# Patient Record
Sex: FEMALE | Employment: UNEMPLOYED | URBAN - METROPOLITAN AREA
[De-identification: names, ages, dates, MRNs, and addresses within clinical notes are randomized per-mention and may not be internally consistent; named-entity substitution may affect disease eponyms.]

---

## 2017-06-03 ENCOUNTER — HOSPITAL ENCOUNTER (OUTPATIENT)
Dept: MAMMOGRAPHY | Facility: HOSPITAL | Age: 67
Discharge: HOME | End: 2017-06-03
Payer: MEDICARE

## 2017-06-03 DIAGNOSIS — Z12.31 ENCOUNTER FOR SCREENING MAMMOGRAM FOR MALIGNANT NEOPLASM OF BREAST: ICD-10-CM

## 2017-06-03 PROCEDURE — 77067 SCR MAMMO BI INCL CAD: CPT | Performed by: INTERNAL MEDICINE

## 2017-06-03 PROCEDURE — 77067 SCR MAMMO BI INCL CAD: CPT

## 2020-01-31 ENCOUNTER — OFFICE VISIT (OUTPATIENT)
Dept: INTERNAL MEDICINE CLINIC | Facility: CLINIC | Age: 70
End: 2020-01-31
Payer: COMMERCIAL

## 2020-01-31 VITALS
HEIGHT: 66 IN | SYSTOLIC BLOOD PRESSURE: 137 MMHG | BODY MASS INDEX: 26.17 KG/M2 | WEIGHT: 162.81 LBS | DIASTOLIC BLOOD PRESSURE: 77 MMHG | TEMPERATURE: 98 F | HEART RATE: 82 BPM

## 2020-01-31 DIAGNOSIS — D22.9 NUMEROUS MOLES: ICD-10-CM

## 2020-01-31 DIAGNOSIS — Z78.0 POST-MENOPAUSAL: ICD-10-CM

## 2020-01-31 DIAGNOSIS — Z12.11 COLON CANCER SCREENING: ICD-10-CM

## 2020-01-31 DIAGNOSIS — Z12.31 VISIT FOR SCREENING MAMMOGRAM: ICD-10-CM

## 2020-01-31 DIAGNOSIS — M15.9 PRIMARY OSTEOARTHRITIS INVOLVING MULTIPLE JOINTS: ICD-10-CM

## 2020-01-31 DIAGNOSIS — E78.5 HYPERLIPIDEMIA, UNSPECIFIED HYPERLIPIDEMIA TYPE: Primary | ICD-10-CM

## 2020-01-31 PROCEDURE — 99204 OFFICE O/P NEW MOD 45 MIN: CPT | Performed by: INTERNAL MEDICINE

## 2020-01-31 PROCEDURE — G0463 HOSPITAL OUTPT CLINIC VISIT: HCPCS | Performed by: INTERNAL MEDICINE

## 2020-01-31 PROCEDURE — 90732 PPSV23 VACC 2 YRS+ SUBQ/IM: CPT | Performed by: INTERNAL MEDICINE

## 2020-01-31 PROCEDURE — G0009 ADMIN PNEUMOCOCCAL VACCINE: HCPCS | Performed by: INTERNAL MEDICINE

## 2020-01-31 RX ORDER — CETIRIZINE HYDROCHLORIDE 10 MG/1
10 TABLET ORAL DAILY
COMMUNITY
End: 2021-03-30

## 2020-01-31 RX ORDER — ATORVASTATIN CALCIUM 20 MG/1
20 TABLET, FILM COATED ORAL NIGHTLY
COMMUNITY
End: 2020-06-23

## 2020-01-31 NOTE — PROGRESS NOTES
HPI:    Patient ID: Angelica Gordon is a 71year old female.     HPI    New paient    Hx of Bell's palsy left 2 years ago residual weakness left  mammo sept 2019 dense breast  Due for pneumonia shot  Flu shot done  dexa due  Colonoscopy due    hyperlipidemi Negative for agitation and behavioral problems. Current Outpatient Medications   Medication Sig Dispense Refill   • atorvastatin 20 MG Oral Tab Take 20 mg by mouth nightly. • cetirizine 10 MG Oral Tab Take 10 mg by mouth daily.        Allergies: or edema. Comments: Bell's palsy left     Lymphadenopathy:     She has no cervical adenopathy. Neurological: She is alert. Skin: Skin is warm and dry. No rash noted. She is not diaphoretic. No erythema.    Numerous moles   Nursing note and vitals r

## 2020-03-13 ENCOUNTER — TELEPHONE (OUTPATIENT)
Dept: CASE MANAGEMENT | Age: 70
End: 2020-03-13

## 2020-03-13 NOTE — TELEPHONE ENCOUNTER
Patient is eligible for a 2020 Medicare Advantage Supervisit. Discussed in detail w/patient. Appt scheduled for 3/27/2020.

## 2020-05-22 ENCOUNTER — OFFICE VISIT (OUTPATIENT)
Dept: DERMATOLOGY CLINIC | Facility: CLINIC | Age: 70
End: 2020-05-22
Payer: COMMERCIAL

## 2020-05-22 DIAGNOSIS — D23.60 BENIGN NEOPLASM OF SKIN OF UPPER LIMB, INCLUDING SHOULDER, UNSPECIFIED LATERALITY: ICD-10-CM

## 2020-05-22 DIAGNOSIS — D22.9 MULTIPLE NEVI: ICD-10-CM

## 2020-05-22 DIAGNOSIS — D23.30 BENIGN NEOPLASM OF SKIN OF FACE: ICD-10-CM

## 2020-05-22 DIAGNOSIS — D23.70 BENIGN NEOPLASM OF SKIN OF LOWER LIMB, INCLUDING HIP, UNSPECIFIED LATERALITY: ICD-10-CM

## 2020-05-22 DIAGNOSIS — D23.4 BENIGN NEOPLASM OF SCALP AND SKIN OF NECK: ICD-10-CM

## 2020-05-22 DIAGNOSIS — L56.5 DSAP (DISSEMINATED SUPERFICIAL ACTINIC POROKERATOSIS): ICD-10-CM

## 2020-05-22 DIAGNOSIS — L82.1 SEBORRHEIC KERATOSES: Primary | ICD-10-CM

## 2020-05-22 DIAGNOSIS — L81.4 LENTIGO: ICD-10-CM

## 2020-05-22 PROCEDURE — 99202 OFFICE O/P NEW SF 15 MIN: CPT | Performed by: DERMATOLOGY

## 2020-05-22 PROCEDURE — G0463 HOSPITAL OUTPT CLINIC VISIT: HCPCS | Performed by: DERMATOLOGY

## 2020-05-31 NOTE — PROGRESS NOTES
Cherelle Bardales is a 71year old female. HPI:     CC:  Patient presents with:  Lesion: New pt. pt presenting today with lesions to Lside of face, bilateral thighs for about a year. c/o roughness. Denies itching or pain, No change in size or color.  Denies Days per week: Not on file        Minutes per session: Not on file      Stress: Not on file    Relationships      Social connections:        Talks on phone: Not on file        Gets together: Not on file        Attends Mandaen service: Not on file distress. Exam total-body performed, including scalp, head, neck, face,nails, hair, external eyes, including conjunctival mucosa, eyelids, lips external ears, back, chest,/ breasts, axillae,  abdomen, arms, legs, palms.      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 Over-the-counter alphahydroxy acids, moisturizers retinoids discussed. Cryo to more bothersome lesions. Fact these are likely to continue develop discussed.     Observe papular lesion at ri

## 2020-06-23 ENCOUNTER — OFFICE VISIT (OUTPATIENT)
Dept: INTERNAL MEDICINE CLINIC | Facility: CLINIC | Age: 70
End: 2020-06-23
Payer: COMMERCIAL

## 2020-06-23 VITALS
BODY MASS INDEX: 27 KG/M2 | DIASTOLIC BLOOD PRESSURE: 88 MMHG | TEMPERATURE: 99 F | WEIGHT: 168 LBS | SYSTOLIC BLOOD PRESSURE: 138 MMHG | HEART RATE: 87 BPM | HEIGHT: 66 IN

## 2020-06-23 DIAGNOSIS — Z01.00 EYE EXAM, ROUTINE: ICD-10-CM

## 2020-06-23 DIAGNOSIS — Z78.0 POST-MENOPAUSAL: ICD-10-CM

## 2020-06-23 DIAGNOSIS — Z00.00 ENCOUNTER FOR MEDICARE ANNUAL WELLNESS EXAM: Primary | ICD-10-CM

## 2020-06-23 DIAGNOSIS — Z12.31 VISIT FOR SCREENING MAMMOGRAM: ICD-10-CM

## 2020-06-23 DIAGNOSIS — D22.9 NUMEROUS MOLES: ICD-10-CM

## 2020-06-23 DIAGNOSIS — E78.5 HYPERLIPIDEMIA, UNSPECIFIED HYPERLIPIDEMIA TYPE: ICD-10-CM

## 2020-06-23 DIAGNOSIS — M15.9 PRIMARY OSTEOARTHRITIS INVOLVING MULTIPLE JOINTS: ICD-10-CM

## 2020-06-23 DIAGNOSIS — Z12.11 COLON CANCER SCREENING: ICD-10-CM

## 2020-06-23 PROCEDURE — G0439 PPPS, SUBSEQ VISIT: HCPCS | Performed by: INTERNAL MEDICINE

## 2020-06-23 PROCEDURE — 96160 PT-FOCUSED HLTH RISK ASSMT: CPT | Performed by: INTERNAL MEDICINE

## 2020-06-23 PROCEDURE — 99397 PER PM REEVAL EST PAT 65+ YR: CPT | Performed by: INTERNAL MEDICINE

## 2020-06-23 RX ORDER — ATORVASTATIN CALCIUM 20 MG/1
20 TABLET, FILM COATED ORAL NIGHTLY
Qty: 90 TABLET | Refills: 3 | Status: SHIPPED | OUTPATIENT
Start: 2020-06-23 | End: 2021-03-30

## 2020-06-24 NOTE — PROGRESS NOTES
HPI:   Angelica Gordon is a 71year old female who presents for a Medicare Subsequent Annual Wellness visit (Pt already had Initial Annual Wellness).       Her last annual assessment has been over 1 year: Annual Physical due on 12/30/1953     /88 (BP Team:  Migue Chapman MD as PCP - General (Internal Medicine)    There is no problem list on file for this patient.     Wt Readings from Last 3 Encounters:  06/23/20 : 168 lb (76.2 kg)  01/31/20 : 162 lb 12.8 oz (73.8 kg)     Last Cholesterol Labs:   No r Right arm, Patient Position: Sitting, Cuff Size: adult)   Pulse 87   Temp 98.6 °F (37 °C) (Oral)   Ht 5' 6\" (1.676 m)   Wt 168 lb (76.2 kg)   BMI 27.12 kg/m²  Estimated body mass index is 27.12 kg/m² as calculated from the following:    Height as of this oropharyngeal exudate or posterior oropharyngeal erythema. Eyes: Pupils are equal, round, and reactive to light. Conjunctivae and EOM are normal. Right eye exhibits no discharge. Left eye exhibits no discharge. No scleral icterus.    Cardiovascular: Izola Confer NO         DIFFERENTIAL, COMP METABOLIC PANEL (14)        Low chol diet advised    (Z12.11) Colon cancer screening  Plan: OP REFERRAL TO [facility] GI TELEPHONE COLON SCREEN        asymptamtic    (Z78.0) Post-menopausal  Plan: XR DEXA BONE DENSITOMETRY (CPT=77080 years No results found for this or any previous visit. No flowsheet data found. Fecal Occult Blood Annually No results found for: FOB No flowsheet data found.     Glaucoma Screening      Ophthalmology Visit Annually: Diabetics, FHx Glaucoma, AA>50, Hisp Template: FABIENNE ALFORD MEDICARE ANNUAL ASSESSMENT FEMALE Chris Thakur [[id number]]

## 2020-09-01 LAB
ALBUMIN/GLOBULIN RATIO: 1.6 (CALC) (ref 1–2.5)
ALBUMIN: 4.6 G/DL (ref 3.6–5.1)
ALKALINE PHOSPHATASE: 101 U/L (ref 37–153)
ALT: 25 U/L (ref 6–29)
APPEARANCE: CLEAR
AST: 21 U/L (ref 10–35)
BILIRUBIN, TOTAL: 0.5 MG/DL (ref 0.2–1.2)
BILIRUBIN: NEGATIVE
BUN: 14 MG/DL (ref 7–25)
CALCIUM: 9.8 MG/DL (ref 8.6–10.4)
CARBON DIOXIDE: 26 MMOL/L (ref 20–32)
CHLORIDE: 106 MMOL/L (ref 98–110)
CHOL/HDLC RATIO: 3.8 (CALC)
CHOLESTEROL, TOTAL: 185 MG/DL
COLOR: YELLOW
CREATININE: 0.81 MG/DL (ref 0.5–0.99)
EGFR IF AFRICN AM: 86 ML/MIN/1.73M2
EGFR IF NONAFRICN AM: 74 ML/MIN/1.73M2
GLOBULIN: 2.9 G/DL (CALC) (ref 1.9–3.7)
GLUCOSE: 106 MG/DL (ref 65–99)
GLUCOSE: NEGATIVE
HDL CHOLESTEROL: 49 MG/DL
HEMATOCRIT: 41.2 % (ref 35–45)
HEMOGLOBIN: 13.9 G/DL (ref 11.7–15.5)
KETONES: NEGATIVE
LDL-CHOLESTEROL: 104 MG/DL (CALC)
MCH: 28.5 PG (ref 27–33)
MCHC: 33.7 G/DL (ref 32–36)
MCV: 84.6 FL (ref 80–100)
MPV: 12.1 FL (ref 7.5–12.5)
NITRITE: NEGATIVE
NON-HDL CHOLESTEROL: 136 MG/DL (CALC)
OCCULT BLOOD: NEGATIVE
PH: 6.5 (ref 5–8)
PLATELET COUNT: 224 THOUSAND/UL (ref 140–400)
POTASSIUM: 4.2 MMOL/L (ref 3.5–5.3)
PROTEIN, TOTAL: 7.5 G/DL (ref 6.1–8.1)
PROTEIN: NEGATIVE
RDW: 13.9 % (ref 11–15)
RED BLOOD CELL COUNT: 4.87 MILLION/UL (ref 3.8–5.1)
SODIUM: 138 MMOL/L (ref 135–146)
SPECIFIC GRAVITY: 1.02 (ref 1–1.03)
T4, FREE: 1 NG/DL (ref 0.8–1.8)
TRIGLYCERIDES: 205 MG/DL
TSH: 4.28 MIU/L (ref 0.4–4.5)
WHITE BLOOD CELL COUNT: 7.2 THOUSAND/UL (ref 3.8–10.8)

## 2020-09-03 ENCOUNTER — TELEPHONE (OUTPATIENT)
Dept: GASTROENTEROLOGY | Facility: CLINIC | Age: 70
End: 2020-09-03

## 2020-09-03 ENCOUNTER — OFFICE VISIT (OUTPATIENT)
Dept: GASTROENTEROLOGY | Facility: CLINIC | Age: 70
End: 2020-09-03
Payer: COMMERCIAL

## 2020-09-03 VITALS
HEART RATE: 89 BPM | TEMPERATURE: 98 F | HEIGHT: 66 IN | WEIGHT: 169.63 LBS | DIASTOLIC BLOOD PRESSURE: 81 MMHG | SYSTOLIC BLOOD PRESSURE: 126 MMHG | BODY MASS INDEX: 27.26 KG/M2

## 2020-09-03 DIAGNOSIS — Z12.11 SCREENING FOR COLORECTAL CANCER: Primary | ICD-10-CM

## 2020-09-03 DIAGNOSIS — Z12.12 SCREENING FOR COLORECTAL CANCER: Primary | ICD-10-CM

## 2020-09-03 DIAGNOSIS — Z98.890 HISTORY OF COLONOSCOPY: ICD-10-CM

## 2020-09-03 DIAGNOSIS — K21.9 GASTROESOPHAGEAL REFLUX DISEASE, ESOPHAGITIS PRESENCE NOT SPECIFIED: ICD-10-CM

## 2020-09-03 PROCEDURE — 3079F DIAST BP 80-89 MM HG: CPT | Performed by: INTERNAL MEDICINE

## 2020-09-03 PROCEDURE — 3008F BODY MASS INDEX DOCD: CPT | Performed by: INTERNAL MEDICINE

## 2020-09-03 PROCEDURE — 99203 OFFICE O/P NEW LOW 30 MIN: CPT | Performed by: INTERNAL MEDICINE

## 2020-09-03 PROCEDURE — 3074F SYST BP LT 130 MM HG: CPT | Performed by: INTERNAL MEDICINE

## 2020-09-03 RX ORDER — POLYETHYLENE GLYCOL 3350, SODIUM CHLORIDE, SODIUM BICARBONATE, POTASSIUM CHLORIDE 420; 11.2; 5.72; 1.48 G/4L; G/4L; G/4L; G/4L
POWDER, FOR SOLUTION ORAL
Qty: 1 BOTTLE | Refills: 0 | Status: ON HOLD | OUTPATIENT
Start: 2020-09-03 | End: 2020-10-13

## 2020-09-03 NOTE — H&P
[facility], [place], [facility] - Gastroenterology                                                                                                  Clinic History and Physical Allergies:  No Known Allergies    ROS:   all 10 systems were reviewed and were negative except as noted in the HPI    PHYSICAL EXAM:   Blood pressure 126/81, pulse 89, temperature 97.7 °F (36.5 °C), temperature source Temporal, height 5' 6\" (1.676 m), Visit:  Requested Prescriptions      No prescriptions requested or ordered in this encounter     Imaging & Referrals:  None     Jerson Conn MD  [facility], [facility] - Gastroenterology  9/3/2020

## 2020-09-03 NOTE — PATIENT INSTRUCTIONS
1. Schedule colonoscopy with monitored anesthesia care (MAC) with Dr. Sarthak Bejarano at [address], or the [facility]    2.  bowel prep from pharmacy ([facility] ).  As we discussed it is important to take the bowel preparation in two parts taking 2

## 2020-09-03 NOTE — TELEPHONE ENCOUNTER
Scheduled for:  Colonoscopy 50539  Provider Name:  Stefanie Poll  Date:  10/13/2020  Location:  [facility]  Sedation:  Mac  Time:  6706 Pm (pt is aware to arrive at 1130 Am)  Prep:  Trilyte Prep instructions were given to pt in the office, pt verbalized Raisa Brewer

## 2020-10-09 ENCOUNTER — HOSPITAL ENCOUNTER (OUTPATIENT)
Dept: MAMMOGRAPHY | Facility: HOSPITAL | Age: 70
Discharge: HOME | End: 2020-10-09
Attending: INTERNAL MEDICINE
Payer: MEDICARE

## 2020-10-09 ENCOUNTER — HOSPITAL ENCOUNTER (OUTPATIENT)
Dept: BONE DENSITY | Facility: HOSPITAL | Age: 70
Discharge: HOME | End: 2020-10-09
Attending: INTERNAL MEDICINE
Payer: MEDICARE

## 2020-10-09 DIAGNOSIS — Z78.0 POST-MENOPAUSAL: ICD-10-CM

## 2020-10-09 DIAGNOSIS — Z12.31 VISIT FOR SCREENING MAMMOGRAM: ICD-10-CM

## 2020-10-09 PROCEDURE — 77067 SCR MAMMO BI INCL CAD: CPT | Performed by: INTERNAL MEDICINE

## 2020-10-09 PROCEDURE — 77080 DXA BONE DENSITY AXIAL: CPT | Performed by: INTERNAL MEDICINE

## 2020-10-09 PROCEDURE — 77063 BREAST TOMOSYNTHESIS BI: CPT | Performed by: INTERNAL MEDICINE

## 2020-10-10 ENCOUNTER — APPOINTMENT (OUTPATIENT)
Dept: LAB | Facility: HOSPITAL | Age: 70
End: 2020-10-10
Attending: INTERNAL MEDICINE
Payer: MEDICARE

## 2020-10-10 DIAGNOSIS — Z01.818 PREOP TESTING: ICD-10-CM

## 2020-10-13 ENCOUNTER — HOSPITAL ENCOUNTER (OUTPATIENT)
Age: 70
Setting detail: HOSPITAL OUTPATIENT SURGERY
Discharge: HOME | End: 2020-10-13
Attending: INTERNAL MEDICINE | Admitting: INTERNAL MEDICINE
Payer: MEDICARE

## 2020-10-13 ENCOUNTER — ANESTHESIA (OUTPATIENT)
Dept: ENDOSCOPY | Age: 70
End: 2020-10-13
Payer: MEDICARE

## 2020-10-13 ENCOUNTER — ANESTHESIA EVENT (OUTPATIENT)
Dept: ENDOSCOPY | Age: 70
End: 2020-10-13
Payer: MEDICARE

## 2020-10-13 VITALS
BODY MASS INDEX: 26.36 KG/M2 | WEIGHT: 164 LBS | RESPIRATION RATE: 15 BRPM | TEMPERATURE: 99 F | SYSTOLIC BLOOD PRESSURE: 128 MMHG | HEIGHT: 66 IN | HEART RATE: 69 BPM | DIASTOLIC BLOOD PRESSURE: 73 MMHG | OXYGEN SATURATION: 97 %

## 2020-10-13 DIAGNOSIS — Z12.12 SCREENING FOR COLORECTAL CANCER: ICD-10-CM

## 2020-10-13 DIAGNOSIS — Z01.818 PREOP TESTING: Primary | ICD-10-CM

## 2020-10-13 DIAGNOSIS — Z12.11 SCREENING FOR COLORECTAL CANCER: ICD-10-CM

## 2020-10-13 PROCEDURE — 99070 SPECIAL SUPPLIES PHYS/QHP: CPT | Performed by: INTERNAL MEDICINE

## 2020-10-13 PROCEDURE — 45380 COLONOSCOPY AND BIOPSY: CPT | Performed by: INTERNAL MEDICINE

## 2020-10-13 RX ORDER — NALOXONE HYDROCHLORIDE 0.4 MG/ML
80 INJECTION, SOLUTION INTRAMUSCULAR; INTRAVENOUS; SUBCUTANEOUS AS NEEDED
Status: CANCELLED | OUTPATIENT
Start: 2020-10-13 | End: 2020-10-13

## 2020-10-13 RX ORDER — LIDOCAINE HYDROCHLORIDE 10 MG/ML
INJECTION, SOLUTION EPIDURAL; INFILTRATION; INTRACAUDAL; PERINEURAL AS NEEDED
Status: DISCONTINUED | OUTPATIENT
Start: 2020-10-13 | End: 2020-10-13 | Stop reason: SURG

## 2020-10-13 RX ORDER — SODIUM CHLORIDE, SODIUM LACTATE, POTASSIUM CHLORIDE, CALCIUM CHLORIDE 600; 310; 30; 20 MG/100ML; MG/100ML; MG/100ML; MG/100ML
INJECTION, SOLUTION INTRAVENOUS CONTINUOUS
Status: CANCELLED | OUTPATIENT
Start: 2020-10-13

## 2020-10-13 RX ORDER — SODIUM CHLORIDE, SODIUM LACTATE, POTASSIUM CHLORIDE, CALCIUM CHLORIDE 600; 310; 30; 20 MG/100ML; MG/100ML; MG/100ML; MG/100ML
INJECTION, SOLUTION INTRAVENOUS CONTINUOUS
Status: DISCONTINUED | OUTPATIENT
Start: 2020-10-13 | End: 2020-10-13

## 2020-10-13 RX ADMIN — SODIUM CHLORIDE, SODIUM LACTATE, POTASSIUM CHLORIDE, CALCIUM CHLORIDE: 600; 310; 30; 20 INJECTION, SOLUTION INTRAVENOUS at 12:49:00

## 2020-10-13 RX ADMIN — SODIUM CHLORIDE, SODIUM LACTATE, POTASSIUM CHLORIDE, CALCIUM CHLORIDE: 600; 310; 30; 20 INJECTION, SOLUTION INTRAVENOUS at 12:35:00

## 2020-10-13 RX ADMIN — LIDOCAINE HYDROCHLORIDE 50 MG: 10 INJECTION, SOLUTION EPIDURAL; INFILTRATION; INTRACAUDAL; PERINEURAL at 12:24:00

## 2020-10-13 RX ADMIN — SODIUM CHLORIDE, SODIUM LACTATE, POTASSIUM CHLORIDE, CALCIUM CHLORIDE: 600; 310; 30; 20 INJECTION, SOLUTION INTRAVENOUS at 12:22:00

## 2020-10-13 NOTE — OPERATIVE REPORT
Colonoscopy Report    Tushar Amy     1950 Age 71year old   PCP Guille Grullon MD Endoscopist Elba Bhatt MD     Date of procedure: 10/13/20    Procedure: Colonoscopy w/ biopsy    Pre-operative diagnosis: colorectal cancer screening the procedure and remained stable. Estimated blood loss: insignificant    Specimens collected:  Colon polyps    Complications: none     Colonoscopy findings:  Terminal ileum: normal mucosa    Cecum: there was a 3 mm polyp removed by cold biopsy forceps.

## 2020-10-13 NOTE — ANESTHESIA PREPROCEDURE EVALUATION
Anesthesia PreOp Note    HPI:     Haja Cedillo is a 71year old female who presents for preoperative consultation requested by: Yarelis Martinez MD    Date of Surgery: 10/13/2020    Procedure(s):  COLONOSCOPY  Indication: Screening for colorectal can needs        Medical: Not on file        Non-medical: Not on file    Tobacco Use      Smoking status: Never Smoker      Smokeless tobacco: Never Used    Substance and Sexual Activity      Alcohol use: Never        Frequency: Never      Drug use: Never BP: 139/74   Pulse: 83   Resp: 18   Temp: 98.8 °F (37.1 °C)   TempSrc: Tympanic   Weight: 74.4 kg (164 lb)   Height: 1.676 m (5' 6\")        Anesthesia Evaluation      Airway   TM distance: >3 FB  Neck ROM: full  Dental - normal exam     Pulmonary - nega

## 2020-10-13 NOTE — H&P
History & Physical Examination    Patient Name: Javad Alicia  MRN: [id number]  CSN: [id number]  YOB: 1950    Diagnosis: colorectal cancer screening      •  atorvastatin 20 MG Oral Tab, Take 1 tablet (20 mg total) by mouth nightly., Disp:

## 2020-10-13 NOTE — ANESTHESIA POSTPROCEDURE EVALUATION
Patient: Verdis Clause    Procedure Summary     Date: 10/13/20 Room / Location: [facility] ENDOSCOPY 01 / [facility] ENDO    Anesthesia Start: 3011 Anesthesia Stop: 0717    Procedure: COLONOSCOPY (N/A ) Diagnosis:       Screening for colorectal cancer      (polyps

## 2020-10-15 ENCOUNTER — TELEPHONE (OUTPATIENT)
Dept: GASTROENTEROLOGY | Facility: CLINIC | Age: 70
End: 2020-10-15

## 2020-10-15 NOTE — TELEPHONE ENCOUNTER
Maryam Alicia MD  [facility] Gi Clinical Staff             GI staff: please place recall for colonoscopy in 3 years      Results letter sent to patient via [address] Letter also printed and mailed out to patient.     Patient outreach entered for 3 year colon

## 2020-10-21 ENCOUNTER — HOSPITAL ENCOUNTER (OUTPATIENT)
Dept: ULTRASOUND IMAGING | Facility: HOSPITAL | Age: 70
Discharge: HOME | End: 2020-10-21
Attending: INTERNAL MEDICINE
Payer: MEDICARE

## 2020-10-21 ENCOUNTER — HOSPITAL ENCOUNTER (OUTPATIENT)
Dept: MAMMOGRAPHY | Facility: HOSPITAL | Age: 70
Discharge: HOME | End: 2020-10-21
Attending: INTERNAL MEDICINE
Payer: MEDICARE

## 2020-10-21 DIAGNOSIS — R92.8 ABNORMAL MAMMOGRAM: ICD-10-CM

## 2020-10-21 PROCEDURE — 76642 ULTRASOUND BREAST LIMITED: CPT | Performed by: INTERNAL MEDICINE

## 2020-10-21 PROCEDURE — 77065 DX MAMMO INCL CAD UNI: CPT | Performed by: INTERNAL MEDICINE

## 2020-10-21 PROCEDURE — 77061 BREAST TOMOSYNTHESIS UNI: CPT | Performed by: INTERNAL MEDICINE

## 2020-10-24 DIAGNOSIS — R92.8 ABNORMAL FINDING ON MAMMOGRAPHY: Primary | ICD-10-CM

## 2020-11-10 ENCOUNTER — TELEPHONE (OUTPATIENT)
Dept: ENDOCRINOLOGY CLINIC | Facility: CLINIC | Age: 70
End: 2020-11-10

## 2020-11-10 ENCOUNTER — OFFICE VISIT (OUTPATIENT)
Dept: ENDOCRINOLOGY CLINIC | Facility: CLINIC | Age: 70
End: 2020-11-10
Payer: COMMERCIAL

## 2020-11-10 VITALS
WEIGHT: 166 LBS | BODY MASS INDEX: 26.68 KG/M2 | SYSTOLIC BLOOD PRESSURE: 132 MMHG | HEIGHT: 66 IN | HEART RATE: 82 BPM | DIASTOLIC BLOOD PRESSURE: 86 MMHG

## 2020-11-10 DIAGNOSIS — E04.9 GOITER: ICD-10-CM

## 2020-11-10 DIAGNOSIS — M81.0 AGE-RELATED OSTEOPOROSIS WITHOUT CURRENT PATHOLOGICAL FRACTURE: Primary | ICD-10-CM

## 2020-11-10 PROCEDURE — 3075F SYST BP GE 130 - 139MM HG: CPT | Performed by: INTERNAL MEDICINE

## 2020-11-10 PROCEDURE — 3008F BODY MASS INDEX DOCD: CPT | Performed by: INTERNAL MEDICINE

## 2020-11-10 PROCEDURE — 99203 OFFICE O/P NEW LOW 30 MIN: CPT | Performed by: INTERNAL MEDICINE

## 2020-11-10 PROCEDURE — 3079F DIAST BP 80-89 MM HG: CPT | Performed by: INTERNAL MEDICINE

## 2020-11-10 NOTE — H&P
New Patient Evaluation - History and Physical    CONSULT - Reason for Visit:  Osteoporosis.   Requesting Physician: Dr. Whalen Homes:  Patient presents with:  Consult  Osteoporosis: Some difficulty getting up from a chair, or walking  Test Res =====  CONCLUSION:   1. Findings in the left femoral neck suggest mild osteopenia, and there may be increased fracture risk. There has been increase in the BMD by 1.0% from previous study.   Findings in the total left hip suggest osteopenia, there may Tab Take 10 mg by mouth daily.      Not taking anymore- cetirizine    ALLERGIES:  No Known Allergies    ASSESSMENTS:   PAIN ASSESSMENT: (Patient reports pain) left hip to the knee    PAIN SCALE: (0-10, please indicate if applicable):  7    FALL ASSESSMENT: pulses, no edema      DATA:   CMP:    Lab Results   Component Value Date     08/31/2020    K 4.2 08/31/2020     08/31/2020    CO2 26 08/31/2020    BUN 14 08/31/2020     (H) 08/31/2020    ALB 4.6 08/31/2020    CA 9.8 08/31/2020     FLP (L

## 2020-11-11 NOTE — TELEPHONE ENCOUNTER
I would like to start this patient on Prolia. Could you please start the process to see if her insurance will cover it? Thank you!

## 2020-11-13 NOTE — TELEPHONE ENCOUNTER
Yes, this is alright! Thank you! I am sorry, actually we are waiting on a couple more blood tests. Once they come back, then we can schedule her visit. Also, I have started her on ergocalciferol 50,000 IU every week for 90 days.  I would like her to hav

## 2020-11-13 NOTE — TELEPHONE ENCOUNTER
Called ins directly. Was advised PA is not needed for Prolia. Call reference #[id number]. Okay to schedule RN visit?

## 2020-12-02 ENCOUNTER — HOSPITAL ENCOUNTER (OUTPATIENT)
Dept: ULTRASOUND IMAGING | Facility: HOSPITAL | Age: 70
Discharge: HOME | End: 2020-12-02
Attending: INTERNAL MEDICINE
Payer: MEDICARE

## 2020-12-02 DIAGNOSIS — E04.9 GOITER: ICD-10-CM

## 2020-12-02 PROCEDURE — 76536 US EXAM OF HEAD AND NECK: CPT | Performed by: INTERNAL MEDICINE

## 2020-12-07 ENCOUNTER — TELEPHONE (OUTPATIENT)
Dept: ENDOCRINOLOGY CLINIC | Facility: CLINIC | Age: 70
End: 2020-12-07

## 2020-12-07 DIAGNOSIS — E04.2 MULTINODULAR GOITER: Primary | ICD-10-CM

## 2020-12-08 NOTE — TELEPHONE ENCOUNTER
Hi!  Can you please call this patient and let her know that I have reviewed her thyroid US and that she has many nodules but they are all less than a centimeter and that we can order a repeat thyroid US in one year?      In addition, there is one nodule evy

## 2020-12-08 NOTE — TELEPHONE ENCOUNTER
Patient returned phone call. RN verified name and . Relayed below message from Dr. Riri Soriano as outlined. She voiced understanding and denied further questions at this time.

## 2021-03-12 DIAGNOSIS — Z23 NEED FOR VACCINATION: ICD-10-CM

## 2021-03-30 ENCOUNTER — OFFICE VISIT (OUTPATIENT)
Dept: INTERNAL MEDICINE CLINIC | Facility: CLINIC | Age: 71
End: 2021-03-30
Payer: COMMERCIAL

## 2021-03-30 VITALS
SYSTOLIC BLOOD PRESSURE: 130 MMHG | HEIGHT: 66 IN | DIASTOLIC BLOOD PRESSURE: 82 MMHG | HEART RATE: 90 BPM | WEIGHT: 173 LBS | BODY MASS INDEX: 27.8 KG/M2

## 2021-03-30 DIAGNOSIS — E78.2 MIXED HYPERLIPIDEMIA: ICD-10-CM

## 2021-03-30 DIAGNOSIS — M79.672 LEFT FOOT PAIN: ICD-10-CM

## 2021-03-30 DIAGNOSIS — M25.552 HIP PAIN, LEFT: Primary | ICD-10-CM

## 2021-03-30 DIAGNOSIS — L98.9 SKIN LESIONS: ICD-10-CM

## 2021-03-30 PROCEDURE — 3079F DIAST BP 80-89 MM HG: CPT | Performed by: INTERNAL MEDICINE

## 2021-03-30 PROCEDURE — 3075F SYST BP GE 130 - 139MM HG: CPT | Performed by: INTERNAL MEDICINE

## 2021-03-30 PROCEDURE — 3008F BODY MASS INDEX DOCD: CPT | Performed by: INTERNAL MEDICINE

## 2021-03-30 PROCEDURE — 99214 OFFICE O/P EST MOD 30 MIN: CPT | Performed by: INTERNAL MEDICINE

## 2021-03-30 RX ORDER — ATORVASTATIN CALCIUM 20 MG/1
20 TABLET, FILM COATED ORAL NIGHTLY
Qty: 90 TABLET | Refills: 3 | Status: SHIPPED | OUTPATIENT
Start: 2021-03-30

## 2021-03-30 NOTE — PROGRESS NOTES
HPI:    Patient ID: Kuldip Jacobsen is a 79year old female. HPI      ?   Medicare annual?  Will come in within the next 3 monthd for medicare annual    hyperlipidmemia    Endo DR Alex Mendoza  oseteoporosis  GI Dr Key correa  Colonoscopy  Follow up right Never Used    Vaping Use      Vaping Use: Never used    Alcohol use: Never    Drug use: Never       PHYSICAL EXAM:    Physical Exam  Constitutional:       Appearance: Normal appearance. Abdominal:      General: Bowel sounds are normal.      Tenderness:  Pooja Read

## 2021-04-07 ENCOUNTER — TELEPHONE (OUTPATIENT)
Dept: INTERNAL MEDICINE CLINIC | Facility: CLINIC | Age: 71
End: 2021-04-07

## 2021-04-07 DIAGNOSIS — M25.552 HIP PAIN, LEFT: Primary | ICD-10-CM

## 2021-04-07 NOTE — TELEPHONE ENCOUNTER
Margot Mason,    Dr. Quinten Camejo is not showing within patient's network. Please re-direct patient to [address] [place] [address] Dept. Thank you, Micaela Tovar Specialist    Managed Care.

## 2021-04-09 ENCOUNTER — PATIENT MESSAGE (OUTPATIENT)
Dept: ADMINISTRATIVE | Age: 71
End: 2021-04-09

## 2021-04-17 NOTE — TELEPHONE ENCOUNTER
•  ergocalciferol 1.25 MG (78830 UT) Oral Cap, Take 1 capsule (50,000 Units total) by mouth once a week., Disp: 12 capsule, Rfl: 1

## 2021-04-20 ENCOUNTER — TELEPHONE (OUTPATIENT)
Dept: ENDOCRINOLOGY CLINIC | Facility: CLINIC | Age: 71
End: 2021-04-20

## 2021-04-20 DIAGNOSIS — M81.0 AGE-RELATED OSTEOPOROSIS WITHOUT CURRENT PATHOLOGICAL FRACTURE: Primary | ICD-10-CM

## 2021-04-20 RX ORDER — ERGOCALCIFEROL (VITAMIN D2) 1250 MCG
50000 CAPSULE ORAL WEEKLY
Qty: 12 CAPSULE | Refills: 0 | Status: SHIPPED | OUTPATIENT
Start: 2021-04-20 | End: 2021-07-26

## 2021-04-21 ENCOUNTER — TELEPHONE (OUTPATIENT)
Dept: CASE MANAGEMENT | Age: 71
End: 2021-04-21

## 2021-04-21 RX ORDER — ERGOCALCIFEROL 1.25 MG/1
CAPSULE ORAL
Qty: 13 CAPSULE | Refills: 0 | OUTPATIENT
Start: 2021-04-21

## 2021-04-21 NOTE — TELEPHONE ENCOUNTER
Hi!  Let us call this patient to come in for blood tests and we should also schedule her Prolia shot if everything looks normal. Thank you!

## 2021-04-21 NOTE — TELEPHONE ENCOUNTER
Patient is eligible for a 2021 Medicare Annual Wellness visit. Left message to call back [phone].

## 2021-04-22 NOTE — TELEPHONE ENCOUNTER
Placed a call to the patient twice and the line was busy both times. Will send a [employer] message as well.

## 2021-06-02 ENCOUNTER — TELEPHONE (OUTPATIENT)
Dept: CASE MANAGEMENT | Age: 71
End: 2021-06-02

## 2021-06-02 ENCOUNTER — HOSPITAL ENCOUNTER (OUTPATIENT)
Dept: ULTRASOUND IMAGING | Facility: HOSPITAL | Age: 71
Discharge: HOME | End: 2021-06-02
Attending: INTERNAL MEDICINE
Payer: MEDICARE

## 2021-06-02 DIAGNOSIS — R92.8 ABNORMAL FINDING ON MAMMOGRAPHY: ICD-10-CM

## 2021-06-02 PROCEDURE — 76642 ULTRASOUND BREAST LIMITED: CPT | Performed by: INTERNAL MEDICINE

## 2021-06-02 NOTE — TELEPHONE ENCOUNTER
Patient is eligible for a 2021 Medicare Annual Wellness visit. Left message to call back [phone].

## 2021-06-14 ENCOUNTER — HOSPITAL ENCOUNTER (OUTPATIENT)
Dept: GENERAL RADIOLOGY | Facility: HOSPITAL | Age: 71
Discharge: HOME | End: 2021-06-14
Attending: INTERNAL MEDICINE
Payer: MEDICARE

## 2021-06-14 ENCOUNTER — HOSPITAL ENCOUNTER (OUTPATIENT)
Dept: ULTRASOUND IMAGING | Facility: HOSPITAL | Age: 71
Discharge: HOME | End: 2021-06-14
Attending: INTERNAL MEDICINE
Payer: MEDICARE

## 2021-06-14 DIAGNOSIS — M25.552 HIP PAIN, LEFT: ICD-10-CM

## 2021-06-14 DIAGNOSIS — E04.2 MULTINODULAR GOITER: ICD-10-CM

## 2021-06-14 PROCEDURE — 76536 US EXAM OF HEAD AND NECK: CPT | Performed by: INTERNAL MEDICINE

## 2021-06-14 PROCEDURE — 73502 X-RAY EXAM HIP UNI 2-3 VIEWS: CPT | Performed by: INTERNAL MEDICINE

## 2021-06-29 ENCOUNTER — OFFICE VISIT (OUTPATIENT)
Dept: INTERNAL MEDICINE CLINIC | Facility: CLINIC | Age: 71
End: 2021-06-29
Payer: COMMERCIAL

## 2021-06-29 VITALS
WEIGHT: 176 LBS | SYSTOLIC BLOOD PRESSURE: 147 MMHG | HEART RATE: 90 BPM | HEIGHT: 66 IN | DIASTOLIC BLOOD PRESSURE: 79 MMHG | BODY MASS INDEX: 28.28 KG/M2

## 2021-06-29 DIAGNOSIS — Z00.00 MEDICARE ANNUAL WELLNESS VISIT, SUBSEQUENT: Primary | ICD-10-CM

## 2021-06-29 DIAGNOSIS — Z00.00 ENCOUNTER FOR ANNUAL HEALTH EXAMINATION: ICD-10-CM

## 2021-06-29 DIAGNOSIS — E04.9 GOITER: ICD-10-CM

## 2021-06-29 DIAGNOSIS — M81.0 AGE-RELATED OSTEOPOROSIS WITHOUT CURRENT PATHOLOGICAL FRACTURE: ICD-10-CM

## 2021-06-29 DIAGNOSIS — Z12.31 VISIT FOR SCREENING MAMMOGRAM: ICD-10-CM

## 2021-06-29 DIAGNOSIS — E78.5 HYPERLIPIDEMIA, UNSPECIFIED HYPERLIPIDEMIA TYPE: ICD-10-CM

## 2021-06-29 PROCEDURE — G0439 PPPS, SUBSEQ VISIT: HCPCS | Performed by: INTERNAL MEDICINE

## 2021-06-29 PROCEDURE — 3078F DIAST BP <80 MM HG: CPT | Performed by: INTERNAL MEDICINE

## 2021-06-29 PROCEDURE — 99397 PER PM REEVAL EST PAT 65+ YR: CPT | Performed by: INTERNAL MEDICINE

## 2021-06-29 PROCEDURE — 96160 PT-FOCUSED HLTH RISK ASSMT: CPT | Performed by: INTERNAL MEDICINE

## 2021-06-29 PROCEDURE — 3008F BODY MASS INDEX DOCD: CPT | Performed by: INTERNAL MEDICINE

## 2021-06-29 PROCEDURE — 3077F SYST BP >= 140 MM HG: CPT | Performed by: INTERNAL MEDICINE

## 2021-07-01 NOTE — PATIENT INSTRUCTIONS
Ela Mckeon's SCREENING SCHEDULE   Tests on this list are recommended by your physician but may not be covered, or covered at this frequency, by your insurer. Please check with your insurance carrier before scheduling to verify coverage.    Josie Bowles 10/09/2020      No recommendations at this time   Pap and Pelvic    Pap   Covered every 2 years for women at normal risk;  Annually if at high risk -  No recommendations at this time    Chlamydia Annually if high risk -  No recommendations at this time   Sc Directives.

## 2021-07-01 NOTE — PROGRESS NOTES
HPI:   Odalys Sorto is a 79year old female who presents for a Medicare Subsequent Annual Wellness visit (Pt already had Initial Annual Wellness).       No topic due editable text        Fall/Risk Assessment   She has been screened for Falls and is low r Component Value Date    AST 19 11/12/2020    ALT 19 11/12/2020    CA 9.7 11/12/2020    ALB 4.5 11/12/2020    TSH 4.28 08/31/2020    CREATSERUM 0.77 11/12/2020    GLU 98 11/12/2020        CBC  (most recent labs)   Lab Results   Component Value Date    WBC light-headedness and headaches. Hematological: Negative for adenopathy. Does not bruise/bleed easily. Psychiatric/Behavioral: Negative for agitation and behavioral problems.         EXAM:   /79 (BP Location: Right arm, Patient Position: Sitting, C Appearance: She is not diaphoretic. HENT:      Head: Normocephalic and atraumatic. Right Ear: External ear normal.      Left Ear: External ear normal.      Nose: Nose normal.      Mouth/Throat:      Pharynx: No oropharyngeal exudate.    Eyes: exam advised.      (E78.5) Hyperlipidemia, unspecified hyperlipidemia type  Plan: ASSAY, THYROID STIM HORMONE, FREE T4 (FREE         THYROXINE), LIPID PANEL, CBC, PLATELET; NO         DIFFERENTIAL, COMP METABOLIC PANEL (14), URINE         MICROSCOPIC W REF pre-diabetes   One screening every 6 months if diagnosed with pre-diabetes Lab Results   Component Value Date    GLU 98 11/12/2020        Cardiovascular Disease Screening    Lipid Panel  Cholesterol  Lipoprotein (HDL)  Triglycerides Covered every 5 years f Influenza Covered once per flu season  Please get every year 10/11/2020  No recommendations at this time    Pneumococcal Each vaccine (Smjwiaz16 & [id number]) covered once after 65 Prevnar 13: -    [id number]: 01/31/2020     No recommendations at this ti

## 2021-07-26 RX ORDER — ERGOCALCIFEROL 1.25 MG/1
CAPSULE ORAL
Qty: 13 CAPSULE | Refills: 0 | Status: SHIPPED | OUTPATIENT
Start: 2021-07-26

## 2021-08-03 ENCOUNTER — TELEPHONE (OUTPATIENT)
Dept: INTERNAL MEDICINE CLINIC | Facility: CLINIC | Age: 71
End: 2021-08-03

## 2021-08-03 NOTE — TELEPHONE ENCOUNTER
Patient is requesting her blood work orders and urine culture order to be mailed to her home address so she can take them to [email or web address]. Confirmed home address in [place].

## 2021-09-18 LAB
ALBUMIN/GLOBULIN RATIO: 1.6 (CALC) (ref 1–2.5)
ALBUMIN: 4.6 G/DL (ref 3.6–5.1)
ALKALINE PHOSPHATASE: 99 U/L (ref 37–153)
ALT: 27 U/L (ref 6–29)
APPEARANCE: CLEAR
AST: 24 U/L (ref 10–35)
BILIRUBIN, TOTAL: 0.6 MG/DL (ref 0.2–1.2)
BILIRUBIN: NEGATIVE
BUN: 12 MG/DL (ref 7–25)
CALCIUM: 9.5 MG/DL (ref 8.6–10.4)
CARBON DIOXIDE: 29 MMOL/L (ref 20–32)
CHLORIDE: 104 MMOL/L (ref 98–110)
CHOL/HDLC RATIO: 4.5 (CALC)
CHOLESTEROL, TOTAL: 186 MG/DL
COLOR: YELLOW
CREATININE: 0.73 MG/DL (ref 0.6–0.93)
EGFR IF AFRICN AM: 97 ML/MIN/1.73M2
EGFR IF NONAFRICN AM: 83 ML/MIN/1.73M2
GLOBULIN: 2.9 G/DL (CALC) (ref 1.9–3.7)
GLUCOSE: 98 MG/DL (ref 65–99)
GLUCOSE: NEGATIVE
HDL CHOLESTEROL: 41 MG/DL
HEMATOCRIT: 39.5 % (ref 35–45)
HEMOGLOBIN: 12.6 G/DL (ref 11.7–15.5)
KETONES: NEGATIVE
LDL-CHOLESTEROL: 111 MG/DL (CALC)
LEUKOCYTE ESTERASE: NEGATIVE
MCH: 27.1 PG (ref 27–33)
MCHC: 31.9 G/DL (ref 32–36)
MCV: 84.9 FL (ref 80–100)
MPV: 11.4 FL (ref 7.5–12.5)
NITRITE: NEGATIVE
NON-HDL CHOLESTEROL: 145 MG/DL (CALC)
OCCULT BLOOD: NEGATIVE
PH: 8 (ref 5–8)
PLATELET COUNT: 231 THOUSAND/UL (ref 140–400)
POTASSIUM: 4.6 MMOL/L (ref 3.5–5.3)
PROTEIN, TOTAL: 7.5 G/DL (ref 6.1–8.1)
PROTEIN: NEGATIVE
RDW: 14.1 % (ref 11–15)
RED BLOOD CELL COUNT: 4.65 MILLION/UL (ref 3.8–5.1)
SODIUM: 140 MMOL/L (ref 135–146)
SPECIFIC GRAVITY: 1.02 (ref 1–1.03)
T4, FREE: 1.1 NG/DL (ref 0.8–1.8)
TRIGLYCERIDES: 224 MG/DL
TSH: 2.86 MIU/L (ref 0.4–4.5)
WHITE BLOOD CELL COUNT: 6.4 THOUSAND/UL (ref 3.8–10.8)

## 2021-11-09 ENCOUNTER — TELEPHONE (OUTPATIENT)
Dept: INTERNAL MEDICINE CLINIC | Facility: CLINIC | Age: 71
End: 2021-11-09

## 2021-12-27 ENCOUNTER — HOSPITAL ENCOUNTER (OUTPATIENT)
Dept: MAMMOGRAPHY | Facility: HOSPITAL | Age: 71
Discharge: HOME | End: 2021-12-27
Attending: INTERNAL MEDICINE
Payer: MEDICARE

## 2021-12-27 DIAGNOSIS — Z12.31 VISIT FOR SCREENING MAMMOGRAM: ICD-10-CM

## 2021-12-27 PROCEDURE — 77067 SCR MAMMO BI INCL CAD: CPT | Performed by: INTERNAL MEDICINE

## 2021-12-27 PROCEDURE — 77063 BREAST TOMOSYNTHESIS BI: CPT | Performed by: INTERNAL MEDICINE

## 2022-03-24 RX ORDER — ATORVASTATIN CALCIUM 20 MG/1
20 TABLET, FILM COATED ORAL NIGHTLY
Qty: 90 TABLET | Refills: 1 | Status: SHIPPED | OUTPATIENT
Start: 2022-03-24 | End: 2022-06-27

## 2022-03-25 NOTE — TELEPHONE ENCOUNTER
Refill passed per [facility] protocol.      Requested Prescriptions   Pending Prescriptions Disp Refills    ATORVASTATIN 20 MG Oral Tab [Pharmacy Med Name: ATORVASTATIN 20MG TABLETS] 90 tablet 3     Sig: TAKE 1 TABLET(20 MG) BY MOUTH EVERY NIGHT        Cholesterol Medication Protocol Passed - 3/24/2022 11:01 PM        Passed - ALT in past 12 months        Passed - LDL in past 12 months        Passed - Last ALT < 80       Lab Results   Component Value Date    ALT 27 09/17/2021             Passed - Last LDL < 130     Lab Results   Component Value Date     (H) 09/17/2021               Passed - Appointment in past 12 or next 3 months                    Recent Outpatient Visits              8 months ago [employer] annual wellness visit, subsequent    Osie Curling, MD    Office Visit    11 months ago Hip pain, left    Osie Curling, MD    Office Visit    1 year ago Age-related osteoporosis without current pathological fracture    [place] [facility] Audrey Lawrence MD    Office Visit    1 year ago Screening for colorectal cancer    Alma Gray, Giovanna Eisenberg MD    Office Visit    1 year ago Encounter for [employer] annual wellness exam    Osie Curling, MD    Office Visit

## 2022-05-17 ENCOUNTER — TELEPHONE (OUTPATIENT)
Dept: INTERNAL MEDICINE CLINIC | Facility: CLINIC | Age: 72
End: 2022-05-17

## 2022-06-27 RX ORDER — ATORVASTATIN CALCIUM 20 MG/1
20 TABLET, FILM COATED ORAL NIGHTLY
Qty: 30 TABLET | Refills: 0 | Status: SHIPPED | OUTPATIENT
Start: 2022-06-27 | End: 2023-06-15

## 2022-06-28 NOTE — TELEPHONE ENCOUNTER
2nd attempt - [employer] message sent for patient to contact the office to schedule an appointment; see notes below.

## 2022-08-09 ENCOUNTER — OFFICE VISIT (OUTPATIENT)
Dept: INTERNAL MEDICINE CLINIC | Facility: CLINIC | Age: 72
End: 2022-08-09
Payer: COMMERCIAL

## 2022-08-09 VITALS
HEIGHT: 66 IN | DIASTOLIC BLOOD PRESSURE: 81 MMHG | BODY MASS INDEX: 27.48 KG/M2 | HEART RATE: 105 BPM | SYSTOLIC BLOOD PRESSURE: 187 MMHG | WEIGHT: 171 LBS

## 2022-08-09 DIAGNOSIS — B02.29 POSTHERPETIC NEURALGIA: ICD-10-CM

## 2022-08-09 DIAGNOSIS — B02.8 HERPES ZOSTER WITH COMPLICATION: Primary | ICD-10-CM

## 2022-08-09 PROCEDURE — 99214 OFFICE O/P EST MOD 30 MIN: CPT | Performed by: INTERNAL MEDICINE

## 2022-08-09 PROCEDURE — 3077F SYST BP >= 140 MM HG: CPT | Performed by: INTERNAL MEDICINE

## 2022-08-09 PROCEDURE — 1125F AMNT PAIN NOTED PAIN PRSNT: CPT | Performed by: INTERNAL MEDICINE

## 2022-08-09 PROCEDURE — 3079F DIAST BP 80-89 MM HG: CPT | Performed by: INTERNAL MEDICINE

## 2022-08-09 PROCEDURE — 3008F BODY MASS INDEX DOCD: CPT | Performed by: INTERNAL MEDICINE

## 2022-08-09 RX ORDER — VALACYCLOVIR HYDROCHLORIDE 1 G/1
TABLET, FILM COATED ORAL
Qty: 21 TABLET | Refills: 0 | Status: SHIPPED | OUTPATIENT
Start: 2022-08-09

## 2022-08-09 RX ORDER — GABAPENTIN 100 MG/1
100 CAPSULE ORAL 3 TIMES DAILY
Qty: 180 CAPSULE | Refills: 0 | Status: SHIPPED | OUTPATIENT
Start: 2022-08-09

## 2022-10-18 ENCOUNTER — TELEPHONE (OUTPATIENT)
Dept: INTERNAL MEDICINE CLINIC | Facility: CLINIC | Age: 72
End: 2022-10-18

## 2022-10-18 NOTE — TELEPHONE ENCOUNTER
[email or web address] to schedule medicare wellness with [facility]

## 2022-11-10 ENCOUNTER — TELEPHONE (OUTPATIENT)
Dept: INTERNAL MEDICINE CLINIC | Facility: CLINIC | Age: 72
End: 2022-11-10

## 2022-12-06 ENCOUNTER — OFFICE VISIT (OUTPATIENT)
Dept: INTERNAL MEDICINE CLINIC | Facility: CLINIC | Age: 72
End: 2022-12-06
Payer: COMMERCIAL

## 2022-12-06 VITALS
HEIGHT: 66 IN | DIASTOLIC BLOOD PRESSURE: 85 MMHG | HEART RATE: 90 BPM | BODY MASS INDEX: 27.8 KG/M2 | SYSTOLIC BLOOD PRESSURE: 166 MMHG | WEIGHT: 173 LBS

## 2022-12-06 DIAGNOSIS — E04.9 GOITER: ICD-10-CM

## 2022-12-06 DIAGNOSIS — Z00.00 MEDICARE ANNUAL WELLNESS VISIT, SUBSEQUENT: Primary | ICD-10-CM

## 2022-12-06 DIAGNOSIS — E78.5 HYPERLIPIDEMIA, UNSPECIFIED HYPERLIPIDEMIA TYPE: ICD-10-CM

## 2022-12-06 DIAGNOSIS — Z12.31 VISIT FOR SCREENING MAMMOGRAM: ICD-10-CM

## 2022-12-06 DIAGNOSIS — Z78.0 POSTMENOPAUSAL: ICD-10-CM

## 2022-12-06 DIAGNOSIS — Z00.00 ENCOUNTER FOR ANNUAL HEALTH EXAMINATION: ICD-10-CM

## 2022-12-06 DIAGNOSIS — Z12.11 SCREENING FOR COLON CANCER: ICD-10-CM

## 2022-12-06 RX ORDER — HYDROCORTISONE ACETATE 25 MG/1
25 SUPPOSITORY RECTAL 2 TIMES DAILY
Qty: 20 SUPPOSITORY | Refills: 1 | Status: SHIPPED | OUTPATIENT
Start: 2022-12-06

## 2022-12-06 RX ORDER — AMLODIPINE BESYLATE 5 MG/1
5 TABLET ORAL DAILY
Qty: 30 TABLET | Refills: 3 | Status: SHIPPED | OUTPATIENT
Start: 2022-12-06 | End: 2023-12-01

## 2022-12-09 ENCOUNTER — TELEPHONE (OUTPATIENT)
Dept: INTERNAL MEDICINE CLINIC | Facility: CLINIC | Age: 72
End: 2022-12-09

## 2022-12-17 NOTE — TELEPHONE ENCOUNTER
Call pt she may use preparation H suppositories rectally  And preparationH cream    Just as effective  If having problem should come in for follow up   If not better

## 2022-12-19 NOTE — TELEPHONE ENCOUNTER
Called patient and relayed below message. Patient stated she is doing better and will get the preparation H if she needs it.

## 2023-01-18 ENCOUNTER — HOSPITAL ENCOUNTER (OUTPATIENT)
Dept: BONE DENSITY | Facility: HOSPITAL | Age: 73
Discharge: HOME | End: 2023-01-18
Attending: INTERNAL MEDICINE
Payer: MEDICARE

## 2023-01-18 ENCOUNTER — HOSPITAL ENCOUNTER (OUTPATIENT)
Dept: MAMMOGRAPHY | Facility: HOSPITAL | Age: 73
Discharge: HOME | End: 2023-01-18
Attending: INTERNAL MEDICINE
Payer: MEDICARE

## 2023-01-18 DIAGNOSIS — Z12.31 VISIT FOR SCREENING MAMMOGRAM: ICD-10-CM

## 2023-01-18 DIAGNOSIS — Z78.0 POSTMENOPAUSAL: ICD-10-CM

## 2023-01-18 PROCEDURE — 77080 DXA BONE DENSITY AXIAL: CPT | Performed by: INTERNAL MEDICINE

## 2023-01-18 PROCEDURE — 77063 BREAST TOMOSYNTHESIS BI: CPT | Performed by: INTERNAL MEDICINE

## 2023-01-18 PROCEDURE — 77067 SCR MAMMO BI INCL CAD: CPT | Performed by: INTERNAL MEDICINE

## 2023-02-03 LAB
ABSOLUTE BASOPHILS: 19 CELLS/UL (ref 0–200)
ABSOLUTE EOSINOPHILS: 223 CELLS/UL (ref 15–500)
ABSOLUTE LYMPHOCYTES: 2654 CELLS/UL (ref 850–3900)
ABSOLUTE MONOCYTES: 391 CELLS/UL (ref 200–950)
ABSOLUTE NEUTROPHILS: 2914 CELLS/UL (ref 1500–7800)
ALBUMIN/GLOBULIN RATIO: 1.6 (CALC) (ref 1–2.5)
ALBUMIN: 4.7 G/DL (ref 3.6–5.1)
ALKALINE PHOSPHATASE: 101 U/L (ref 37–153)
ALT: 19 U/L (ref 6–29)
AST: 21 U/L (ref 10–35)
BASOPHILS: 0.3 %
BILIRUBIN, TOTAL: 0.7 MG/DL (ref 0.2–1.2)
BUN: 15 MG/DL (ref 7–25)
CALCIUM: 9.9 MG/DL (ref 8.6–10.4)
CARBON DIOXIDE: 28 MMOL/L (ref 20–32)
CHLORIDE: 104 MMOL/L (ref 98–110)
CREATININE: 0.86 MG/DL (ref 0.6–1)
EGFR: 72 ML/MIN/1.73M2
EOSINOPHILS: 3.6 %
GLOBULIN: 2.9 G/DL (CALC) (ref 1.9–3.7)
GLUCOSE: 97 MG/DL (ref 65–99)
HEMATOCRIT: 40.6 % (ref 35–45)
HEMOGLOBIN: 13.4 G/DL (ref 11.7–15.5)
LYMPHOCYTES: 42.8 %
MCH: 27.7 PG (ref 27–33)
MCHC: 33 G/DL (ref 32–36)
MCV: 84.1 FL (ref 80–100)
MONOCYTES: 6.3 %
MPV: 12 FL (ref 7.5–12.5)
NEUTROPHILS: 47 %
PLATELET COUNT: 241 THOUSAND/UL (ref 140–400)
POTASSIUM: 4.6 MMOL/L (ref 3.5–5.3)
PROTEIN, TOTAL: 7.6 G/DL (ref 6.1–8.1)
RDW: 13.8 % (ref 11–15)
RED BLOOD CELL COUNT: 4.83 MILLION/UL (ref 3.8–5.1)
SODIUM: 140 MMOL/L (ref 135–146)
T4, FREE: 1 NG/DL (ref 0.8–1.8)
TSH: 3.38 MIU/L (ref 0.4–4.5)
WHITE BLOOD CELL COUNT: 6.2 THOUSAND/UL (ref 3.8–10.8)

## 2023-03-07 ENCOUNTER — TELEPHONE (OUTPATIENT)
Facility: CLINIC | Age: 73
End: 2023-03-07

## 2023-03-07 ENCOUNTER — OFFICE VISIT (OUTPATIENT)
Facility: CLINIC | Age: 73
End: 2023-03-07

## 2023-03-07 VITALS
DIASTOLIC BLOOD PRESSURE: 83 MMHG | WEIGHT: 168 LBS | SYSTOLIC BLOOD PRESSURE: 143 MMHG | HEIGHT: 66 IN | HEART RATE: 88 BPM | BODY MASS INDEX: 27 KG/M2

## 2023-03-07 DIAGNOSIS — Z86.010 PERSONAL HISTORY OF COLONIC POLYPS: Primary | ICD-10-CM

## 2023-03-07 DIAGNOSIS — Z86.010 HX OF COLONIC POLYPS: Primary | ICD-10-CM

## 2023-03-07 PROCEDURE — 99213 OFFICE O/P EST LOW 20 MIN: CPT | Performed by: STUDENT IN AN ORGANIZED HEALTH CARE EDUCATION/TRAINING PROGRAM

## 2023-03-07 PROCEDURE — 3077F SYST BP >= 140 MM HG: CPT | Performed by: STUDENT IN AN ORGANIZED HEALTH CARE EDUCATION/TRAINING PROGRAM

## 2023-03-07 PROCEDURE — 3008F BODY MASS INDEX DOCD: CPT | Performed by: STUDENT IN AN ORGANIZED HEALTH CARE EDUCATION/TRAINING PROGRAM

## 2023-03-07 PROCEDURE — 3079F DIAST BP 80-89 MM HG: CPT | Performed by: STUDENT IN AN ORGANIZED HEALTH CARE EDUCATION/TRAINING PROGRAM

## 2023-03-07 RX ORDER — SODIUM, POTASSIUM,MAG SULFATES 17.5-3.13G
SOLUTION, RECONSTITUTED, ORAL ORAL
Qty: 1 EACH | Refills: 0 | Status: SHIPPED | OUTPATIENT
Start: 2023-03-07

## 2023-03-07 NOTE — PATIENT INSTRUCTIONS
1. Schedule colonoscopy with MAC [Diagnosis:history of colon polyps]    2.  bowel prep from pharmacy (split dose suprep)    3. Continue all medications for procedure    4. Read all bowel prep instructions carefully    5. AVOID seeds, nuts, popcorn, raw fruits and vegetables (cooked is okay) for 2-3 days before procedure    6. You MAY need to go for COVID testing 72 hours before procedure. The testing team will call you a few days before your procedure to discuss with you if testing is required. If you are asked to go for COVID testing and do not completed the test, the procedure cannot be performed. 7. If you start any NEW medication after your visit today, please notify us. Certain medications will need to be held before the procedure, or the procedure cannot be performed.

## 2023-03-07 NOTE — TELEPHONE ENCOUNTER
Scheduled for:  Colonoscopy 97832  Provider Name:  Dr Caty Mendieta  Date:  06/05/2023  Location:  [facility]  Sedation:  MAC  Time:  9839 (pt is aware to arrive at [address] [facility])    Prep: Colyte  Meds/Allergies Reconciled?:  Physician reviewed  Diagnosis with codes:  Hx of colon polyps z86.010  Was patient informed to call insurance with codes (Y/N):  y     Referral sent?:  Referral was sent at the time of electronic surgical scheduling. [facility] or [facility] [address]  notified?:  I sent an electronic request to Endo Scheduling and received a confirmation today. Medication Orders:  Pt is aware to NOT take iron pills, herbal meds and diet supplements for 7 days before exam. Also to NOT take any form of alcohol, recreational drugs and any forms of ED meds 24 hours before exam.     Misc Orders:       Further instructions given by staff:  I discussed the prep intructions with the patient in office which she verbally understood. Copy of instructions was handed to patient as well. Patient was also advised he will receive a call from PAT nurse 72-24 hours prior procedure to schedule Covid test done.

## 2023-03-14 ENCOUNTER — TELEPHONE (OUTPATIENT)
Facility: CLINIC | Age: 73
End: 2023-03-14

## 2023-03-14 NOTE — TELEPHONE ENCOUNTER
Pt is calling to request a time reschedule for her procedure on 6/5/2023.     Pt states it is too early and would like a different time

## 2023-03-15 ENCOUNTER — TELEPHONE (OUTPATIENT)
Dept: INTERNAL MEDICINE CLINIC | Facility: CLINIC | Age: 73
End: 2023-03-15

## 2023-03-15 NOTE — TELEPHONE ENCOUNTER
Reached patient for medication adherence consult. Patient failed adherence measure for atorvastatin in 2022 per insurance report. Patient reports that she is taking her atorvastatin daily without missing doses. Patient reports that she is tolerating the medication well. Appears that patient has not filled her atorvastatin since June 2022 but patient states that she refilled it this month. Patient states that she has plenty of medication at this time. Provided education on importance of adherence for optimal benefit. Patient denies any questions or concerns with medication.

## 2023-04-01 RX ORDER — AMLODIPINE BESYLATE 5 MG/1
TABLET ORAL
Qty: 30 TABLET | Refills: 3 | Status: SHIPPED | OUTPATIENT
Start: 2023-04-01

## 2023-06-14 NOTE — TELEPHONE ENCOUNTER
Lisbet Holcomb from [facility] is calling on the patients behalf requesting a refill on the following medication.     atorvastatin 20 MG Oral Tab

## 2023-06-15 NOTE — TELEPHONE ENCOUNTER
Please review refill protocol failed/ no protocol  Requested Prescriptions   Pending Prescriptions Disp Refills    amLODIPine 5 MG Oral Tab 30 tablet 3     Sig: Take 1 tablet (5 mg total) by mouth daily.        Hypertensive Medications Protocol Failed - 6/14/2023  3:20 PM        Failed - Last BP reading less than 140/90     BP Readings from Last 1 Encounters:  03/07/23 : 143/83              Failed - In person appointment or virtual visit in the past 6 months     Recent Outpatient Visits              3 months ago Personal history of colonic polyps    J Carlos Harrison, Siobhan Live MD    Office Visit    6 months ago Medicare annual wellness visit, subsequent    Vishnu Justice MD    Office Visit    10 months ago Herpes zoster with complication    Vishnu Justice MD    Office Visit    [facility]    Vishnu Justice [facility]    Office Visit    2 years ago Hip pain, left    Vishnu Justice MD    Office Visit          Future Appointments         Provider Department Appt Notes    In 2 months [employer], [address], [address], [place] [facility] @ [address] [facility] Ceci Martinez - In person appointment in the past 12 or next 3 months     Recent Outpatient Visits              3 months ago Personal history of colonic Sarwat John Paul, [address], Siobhan Live MD    Office Visit    6 months ago [employer] annual wellness visit, subsequent    Vishnu Justice MD    Office Visit    10 months ago Herpes zoster with complication    [address], [facility] Manjeet Guadarrama, MD    Office Visit    1 year ago Medicare annual wellness visit, subsequent    Tavon Ferrara MD    Office Visit    2 years ago Hip pain, left    Tavon Ferrara MD    Office Visit          Future Appointments         Provider Department Appt Notes    In 2 months [address] [employer], [address], [place] [facility] @ [facility]               Passed - CMP or BMP in past 6 months     Recent Results (from the past 4392 hour(s))   COMP METABOLIC PANEL (14)    Collection Time: 02/02/23 10:23 AM   Result Value Ref Range    GLUCOSE 97 65 - 99 mg/dL     Comment:               Fasting reference interval         UREA NITROGEN (BUN) 15 7 - 25 mg/dL    CREATININE 0.86 0.60 - 1.00 mg/dL    EGFR 72 > OR = 60 mL/min/1.73m2     Comment: The eGFR is based on the CKD-EPI 2021 equation. To calculate   the new eGFR from a previous Creatinine or Cystatin C  result, go to [email or web address]  [email or web address]      BUN/CREATININE RATIO NOT APPLICABLE 6 - 22 (calc)    SODIUM 140 135 - 146 mmol/L    POTASSIUM 4.6 3.5 - 5.3 mmol/L    CHLORIDE 104 98 - 110 mmol/L    CARBON DIOXIDE 28 20 - 32 mmol/L    CALCIUM 9.9 8.6 - 10.4 mg/dL    PROTEIN, TOTAL 7.6 6.1 - 8.1 g/dL    ALBUMIN 4.7 3.6 - 5.1 g/dL    GLOBULIN 2.9 1.9 - 3.7 g/dL (calc)    ALBUMIN/GLOBULIN RATIO 1.6 1.0 - 2.5 (calc)    BILIRUBIN, TOTAL 0.7 0.2 - 1.2 mg/dL    ALKALINE PHOSPHATASE 101 37 - 153 U/L    AST 21 10 - 35 U/L    ALT 19 6 - 29 U/L     *Note: Due to a large number of results and/or encounters for the requested time period, some results have not been displayed. A complete set of results can be found in Results Review.                Passed - EGFRCR or GFRNAA > 50     GFR Evaluation

## 2023-06-15 NOTE — TELEPHONE ENCOUNTER
Please review refill protocol failed/ no protocol  Requested Prescriptions   Pending Prescriptions Disp Refills    atorvastatin 20 MG Oral Tab 30 tablet 0     Sig: Take 1 tablet (20 mg total) by mouth nightly.        Cholesterol Medication Protocol Failed - 6/14/2023  2:59 PM        Failed - LDL in past 12 months        Failed - Last LDL < 130     Lab Results   Component Value Date     (H) 09/17/2021             Passed - ALT in past 12 months        Passed - Last ALT < 80     Lab Results   Component Value Date    ALT 19 02/02/2023             Passed - In person appointment or virtual visit in the past 12 mos or appointment in next 3 mos     Recent Outpatient Visits              3 months ago Personal history of colonic Josep Elliott, Emy Medrano MD    Office Visit    6 months ago [facility] annual wellness visit, subsequent    Reanna Cohen MD    Office Visit    10 months ago Herpes zoster with complication    Reanna Cohen MD    Office Visit    1 year ago [facility] annual wellness visit, subsequent    [address], Igor Loya MD    Office Visit    2 years ago Hip pain, left    Reanna Cohen MD    Office Visit          Future Appointments         Provider Department Appt Notes    In 2 months [employer], [address], [address], [place] [facility] @ [facility]

## 2023-06-16 RX ORDER — ATORVASTATIN CALCIUM 20 MG/1
20 TABLET, FILM COATED ORAL NIGHTLY
Qty: 90 TABLET | Refills: 0 | Status: SHIPPED | OUTPATIENT
Start: 2023-06-16

## 2023-06-16 RX ORDER — AMLODIPINE BESYLATE 5 MG/1
5 TABLET ORAL DAILY
Qty: 30 TABLET | Refills: 0 | Status: SHIPPED | OUTPATIENT
Start: 2023-06-16

## 2023-06-29 RX ORDER — AMLODIPINE BESYLATE 5 MG/1
5 TABLET ORAL DAILY
Qty: 30 TABLET | Refills: 0 | Status: SHIPPED | OUTPATIENT
Start: 2023-06-29

## 2023-08-13 RX ORDER — AMLODIPINE BESYLATE 5 MG/1
5 TABLET ORAL DAILY
Qty: 30 TABLET | Refills: 0 | OUTPATIENT
Start: 2023-08-13

## 2023-08-13 NOTE — TELEPHONE ENCOUNTER
Please review. Protocol failed / No Protocol. Medication never prescribed by clinic    Requested Prescriptions   Pending Prescriptions Disp Refills    Omega-3 Fatty Acids (OMEGA-3 OR)  0     Sig: Take by mouth. There is no refill protocol information for this order       amLODIPine 5 MG Oral Tab 30 tablet 0     Sig: Take 1 tablet (5 mg total) by mouth daily. Hypertensive Medications Protocol Failed - 8/11/2023 10:28 AM        Failed - Last BP reading less than 140/90     BP Readings from Last 1 Encounters:  03/07/23 : 143/83              Failed - CMP or BMP in past 6 months     No results found for this or any previous visit (from the past 4392 hour(s)).             Failed - In person appointment or virtual visit in the past 6 months     Recent Outpatient Visits              5 months ago Personal history of colonic polyps    [address] [facility], Сергей Live MD    Office Visit    8 months ago [employer] annual wellness visit, subsequent    Merlene Martins MD    Office Visit    1 year ago Herpes zoster with complication    Merlene Martins MD    Office Visit    2 years ago [facility] Bledso[facility] annual wellness visit, subsequent    Merlene Martins MD    Office Visit    2 years ago Hip pain, left    Merlene Martins MD    Office Visit          Future Appointments         Provider Department Appt Notes    [facility] [employer], [address], [address], [place] [facility] @ [address] [facility] Ceci Martinez - In person appointment in the past 12 or next 3 months     Recent Outpatient Visits              5 months ago Personal history of colonic polyps    [address], [address], Сергей Live MD Office Visit    8 months ago [employer] annual wellness visit, subsequent    Low Parish MD    Office Visit    1 year ago Herpes zoster with complication    Low Parish MD    Office Visit    2 years ago Corey Bledsoe annual wellness visit, subsequent    Low Parish MD    Office Visit    2 years ago Hip pain, left    Low Parish MD    Office Visit          Future Appointments         Provider Department Appt Notes    Tomorrow [employer], [address], [address], [place] [facility] @ [address] [place] [address] 1               Passed - EGFRCR or GFRNAA > 50     GFR Evaluation

## 2023-08-14 ENCOUNTER — ANESTHESIA (OUTPATIENT)
Dept: ENDOSCOPY | Age: 73
End: 2023-08-14
Payer: MEDICARE

## 2023-08-14 ENCOUNTER — HOSPITAL ENCOUNTER (OUTPATIENT)
Age: 73
Setting detail: HOSPITAL OUTPATIENT SURGERY
Discharge: HOME | End: 2023-08-14
Attending: STUDENT IN AN ORGANIZED HEALTH CARE EDUCATION/TRAINING PROGRAM | Admitting: STUDENT IN AN ORGANIZED HEALTH CARE EDUCATION/TRAINING PROGRAM
Payer: MEDICARE

## 2023-08-14 ENCOUNTER — ANESTHESIA EVENT (OUTPATIENT)
Dept: ENDOSCOPY | Age: 73
End: 2023-08-14
Payer: MEDICARE

## 2023-08-14 VITALS
WEIGHT: 160 LBS | BODY MASS INDEX: 25.71 KG/M2 | RESPIRATION RATE: 20 BRPM | OXYGEN SATURATION: 96 % | DIASTOLIC BLOOD PRESSURE: 78 MMHG | TEMPERATURE: 97 F | HEART RATE: 69 BPM | SYSTOLIC BLOOD PRESSURE: 120 MMHG | HEIGHT: 66 IN

## 2023-08-14 DIAGNOSIS — Z86.010 HISTORY OF COLON POLYPS: ICD-10-CM

## 2023-08-14 PROBLEM — Z86.0100 HISTORY OF COLON POLYPS: Status: ACTIVE | Noted: 2023-08-14

## 2023-08-14 PROCEDURE — 45385 COLONOSCOPY W/LESION REMOVAL: CPT | Performed by: STUDENT IN AN ORGANIZED HEALTH CARE EDUCATION/TRAINING PROGRAM

## 2023-08-14 PROCEDURE — 99070 SPECIAL SUPPLIES PHYS/QHP: CPT | Performed by: STUDENT IN AN ORGANIZED HEALTH CARE EDUCATION/TRAINING PROGRAM

## 2023-08-14 PROCEDURE — 45380 COLONOSCOPY AND BIOPSY: CPT | Performed by: STUDENT IN AN ORGANIZED HEALTH CARE EDUCATION/TRAINING PROGRAM

## 2023-08-14 RX ORDER — CHLORAL HYDRATE 500 MG
CAPSULE ORAL
Refills: 0 | OUTPATIENT
Start: 2023-08-14 | End: 2023-09-13

## 2023-08-14 RX ORDER — NALOXONE HYDROCHLORIDE 0.4 MG/ML
80 INJECTION, SOLUTION INTRAMUSCULAR; INTRAVENOUS; SUBCUTANEOUS AS NEEDED
Status: DISCONTINUED | OUTPATIENT
Start: 2023-08-14 | End: 2023-08-14

## 2023-08-14 RX ORDER — AMLODIPINE BESYLATE 5 MG/1
5 TABLET ORAL DAILY
Qty: 90 TABLET | Refills: 0 | Status: SHIPPED | OUTPATIENT
Start: 2023-08-14

## 2023-08-14 RX ORDER — SODIUM CHLORIDE, SODIUM LACTATE, POTASSIUM CHLORIDE, CALCIUM CHLORIDE 600; 310; 30; 20 MG/100ML; MG/100ML; MG/100ML; MG/100ML
INJECTION, SOLUTION INTRAVENOUS CONTINUOUS
Status: DISCONTINUED | OUTPATIENT
Start: 2023-08-14 | End: 2023-08-14

## 2023-08-14 RX ORDER — LIDOCAINE HYDROCHLORIDE 10 MG/ML
INJECTION, SOLUTION EPIDURAL; INFILTRATION; INTRACAUDAL; PERINEURAL AS NEEDED
Status: DISCONTINUED | OUTPATIENT
Start: 2023-08-14 | End: 2023-08-14 | Stop reason: SURG

## 2023-08-14 RX ADMIN — SODIUM CHLORIDE, SODIUM LACTATE, POTASSIUM CHLORIDE, CALCIUM CHLORIDE: 600; 310; 30; 20 INJECTION, SOLUTION INTRAVENOUS at 10:25:00

## 2023-08-14 RX ADMIN — LIDOCAINE HYDROCHLORIDE 50 MG: 10 INJECTION, SOLUTION EPIDURAL; INFILTRATION; INTRACAUDAL; PERINEURAL at 10:25:00

## 2023-08-14 NOTE — OPERATIVE REPORT
COLONOSCOPY REPORT    Jonathan Hill     1950 Age 67year old   PCP Trell Dougherty MD Endoscopist Brandy Alford MD       Date of procedure: 23    Procedure: Colonoscopy w/ cold snare polypectomy, cold forceps polypectomy    Pre-operative diagnosis: surveillance colonoscopy    Post-operative diagnosis: polyps, diverticulosis, hemorrhoids    Medications: MAC    Withdrawal time: 13 minutes    Procedure:  Informed consent was obtained from the patient after the risks of the procedure were discussed, including but not limited to bleeding, perforation, aspiration, infection, or possibility of a missed lesion. After discussions of the risks/benefits and alternatives to this procedure, as well as the planned sedation, the patient was placed in the left lateral decubitus position and begun on continuous blood pressure pulse oximetry and EKG monitoring and this was maintained throughout the procedure. Once an adequate level of sedation was obtained a digital rectal exam was completed. Then the lubricated tip of the [id number] diagnostic video colonoscope was inserted and advanced without difficulty to the cecum using water immersion and CO2 insufflation technique. The cecum was identified by localizing the trifold, the appendix and the ileocecal valve. Withdrawal was begun with thorough washing and careful examination of the colonic walls and folds. A routine second examination of the cecum/ascending colon was performed. Photodocumentation was obtained. The bowel prep was good. Views of the colon were good with washing. I then carefully withdrew the instrument from the patient who tolerated the procedure well. Complications: none. Findings:   1. 4 polyps noted as follows:      A. TWO -- 2 mm polyps in the transverse colon; sessile morphology; cold forceps polypectomy performed, polyps retrieved.       B. 4 mm polyp in the transverse colon; sessile morphology; cold snare polypectomy performed, polyp retrieved. C. 2 mm polyp in the rectum; sessile morphology; cold forceps polypectomy performed, polyp retrieved. 2. Diverticulosis: small scattered diverticula in the left colon. 3. Ileocecal valve appeared healthy and normal. Terminal ileum was intubated and appeared normal.    4. The colonic mucosa throughout the colon showed normal vascular pattern, without evidence of angioectasias or inflammation. 5. A retroflexed view of the rectum revealed small internal hemorrhoids. 6. SOURAV: normal rectal tone, no masses palpated. Impression:   4 polyps resected ranging in size from 2 mm to 4 mm  Normal terminal ileum. Diverticulosis. Internal hemorrhoids. The colon was otherwise normal with glistening mucosa and intact vascular pattern. Recommend:  Await pathology. The interval for the next colonoscopy will be determined after reviewing pathology. If new signs or symptoms develop, colonoscopy may need to be repeated sooner. High fiber diet. Monitor for blood in the stool. If having more than just tinge of blood, call office or go to the ER.    >>>If tissue was obtained and you have not received your pathology results either by phone or letter within 2 weeks, please call our office at [phone].     Specimens: polyps    Blood loss: <1 ml

## 2023-08-14 NOTE — H&P
History & Physical Examination    Patient Name: Cassy Saenz  MRN: [id number]  CSN: [id number]  YOB: 1950    Diagnosis: history of colon polyps, surveillance colonoscopy. atorvastatin 20 MG Oral Tab, Take 1 tablet (20 mg total) by mouth nightly., Disp: 90 tablet, Rfl: 0  Omega-3 Fatty Acids (OMEGA-3 OR), Take by mouth., Disp: , Rfl:   gabapentin 100 MG Oral Cap, Take 1 capsule (100 mg total) by mouth 3 (three) times daily. , Disp: 180 capsule, Rfl: 0  amLODIPine 5 MG Oral Tab, Take 1 tablet (5 mg total) by mouth daily. , Disp: 90 tablet, Rfl: 0  Na Sulfate-K Sulfate-Mg Sulf (SUPREP BOWEL PREP KIT) 17.5-3.13-1.6 GM/177ML Oral Solution, Take as discussed in clinic, Disp: 1 each, Rfl: 0  hydrocortisone (ANUSOL-HC) 25 MG Rectal Suppos, Place 1 suppository (25 mg total) rectally 2 (two) times daily. , Disp: 20 suppository, Rfl: 1  Naproxen Sodium (FLANAX PAIN RELIEF OR), Take by mouth., Disp: , Rfl:       No current facility-administered medications for this encounter.       Allergies: No Known Allergies    Past Medical History:   Diagnosis Date    Arthritis 2011    Bell's palsy 2018    Colon adenomas 10/13/2020    #3    High blood pressure     High cholesterol     Hyperlipidemia 2005     Past Surgical History:   Procedure Laterality Date    COLONOSCOPY  12/2000    Nura Bread    COLONOSCOPY      [facility]    COLONOSCOPY N/A 10/13/2020    Procedure: COLONOSCOPY;  Surgeon: Brandan Don MD;  Location: [facility]     Family History   Problem Relation Age of Onset    Heart Disorder Father     Renal Disease Mother     Hypertension Sister     Breast Cancer Neg     Ovarian Cancer Neg      Social History    Tobacco Use      Smoking status: Never      Smokeless tobacco: Never    Alcohol use: Never      SYSTEM Check if Review is Normal Check if Physical Exam is Normal If not normal, please explain:   LIZZY Mckeon ] [ Vitor Lou  Leticia.Layla ] [ Yassine Lew Leticia.Layla ] [ Ernesto Earing Leticia.Layla ] [ Delmer Smith Leticia.Layla ] [ Vandana Clemencia Leticia.Layla ] [ Harrison Acuña OTHER        I have discussed the risks and benefits and alternatives of the procedure with the patient/family. They understand and agree to proceed with plan of care. I have reviewed the History and Physical done within the last 30 days. Any changes noted above.     Ashlyn Sparks MD  [facility]

## 2023-08-14 NOTE — DISCHARGE INSTRUCTIONS

## 2023-08-15 ENCOUNTER — OFFICE VISIT (OUTPATIENT)
Dept: INTERNAL MEDICINE CLINIC | Facility: CLINIC | Age: 73
End: 2023-08-15

## 2023-08-15 ENCOUNTER — TELEPHONE (OUTPATIENT)
Facility: CLINIC | Age: 73
End: 2023-08-15

## 2023-08-15 VITALS
DIASTOLIC BLOOD PRESSURE: 74 MMHG | SYSTOLIC BLOOD PRESSURE: 129 MMHG | HEART RATE: 90 BPM | HEIGHT: 66 IN | WEIGHT: 173 LBS | BODY MASS INDEX: 27.8 KG/M2

## 2023-08-15 DIAGNOSIS — E04.9 GOITER: ICD-10-CM

## 2023-08-15 DIAGNOSIS — I10 PRIMARY HYPERTENSION: Primary | ICD-10-CM

## 2023-08-15 DIAGNOSIS — E78.5 HYPERLIPIDEMIA, UNSPECIFIED HYPERLIPIDEMIA TYPE: ICD-10-CM

## 2023-08-15 DIAGNOSIS — M79.671 RIGHT FOOT PAIN: ICD-10-CM

## 2023-08-15 DIAGNOSIS — R60.0 LOCALIZED EDEMA: ICD-10-CM

## 2023-08-15 PROCEDURE — 3078F DIAST BP <80 MM HG: CPT | Performed by: INTERNAL MEDICINE

## 2023-08-15 PROCEDURE — 3074F SYST BP LT 130 MM HG: CPT | Performed by: INTERNAL MEDICINE

## 2023-08-15 PROCEDURE — 99214 OFFICE O/P EST MOD 30 MIN: CPT | Performed by: INTERNAL MEDICINE

## 2023-08-15 PROCEDURE — 3008F BODY MASS INDEX DOCD: CPT | Performed by: INTERNAL MEDICINE

## 2023-08-15 PROCEDURE — 1126F AMNT PAIN NOTED NONE PRSNT: CPT | Performed by: INTERNAL MEDICINE

## 2023-08-15 PROCEDURE — 1159F MED LIST DOCD IN RCRD: CPT | Performed by: INTERNAL MEDICINE

## 2023-08-15 NOTE — PROGRESS NOTES
Three adenomas removed on recent colonoscopy. She has a history of adenomatous polyps. Would repeat in 3 years. Mychart sent to patient. GI Staff:    Can you please place recall for this patient to have a colonoscopy in 3 years. Thank you.     Maine Wang MD

## 2023-08-16 NOTE — TELEPHONE ENCOUNTER
----- Message from Jenn Lopez MD sent at 8/15/2023  3:28 PM CDT -----  Three adenomas removed on recent colonoscopy. She has a history of adenomatous polyps. Would repeat in 3 years. Mychart sent to patient. GI Staff:    Can you please place recall for this patient to have a colonoscopy in 3 years. Thank you.     Jenn Lopez MD

## 2023-08-17 NOTE — TELEPHONE ENCOUNTER
Recall colon in 3 years per Dr Elizabeth Neely.  Colon done 08/14/2023    Health maintenance updated and message sent to pt outreach to repeat colonoscopy in 3 years

## 2023-08-25 ENCOUNTER — MED REC SCAN ONLY (OUTPATIENT)
Facility: CLINIC | Age: 73
End: 2023-08-25

## 2023-08-25 ENCOUNTER — TELEPHONE (OUTPATIENT)
Dept: INTERNAL MEDICINE CLINIC | Facility: CLINIC | Age: 73
End: 2023-08-25

## 2023-09-26 RX ORDER — ATORVASTATIN CALCIUM 20 MG/1
20 TABLET, FILM COATED ORAL NIGHTLY
Qty: 90 TABLET | Refills: 3 | Status: SHIPPED | OUTPATIENT
Start: 2023-09-26

## 2023-09-26 NOTE — TELEPHONE ENCOUNTER
Please review refill protocol failed/ no protocol  Requested Prescriptions   Pending Prescriptions Disp Refills    ATORVASTATIN 20 MG Oral Tab [Pharmacy Med Name: ATORVASTATIN 20MG TABLETS] 30 tablet 0     Sig: TAKE 1 TABLET(20 MG) BY MOUTH EVERY NIGHT       Cholesterol Medication Protocol Failed - 9/25/2023  8:49 AM        Failed - LDL in past 12 months        Failed - Last LDL < 130     Lab Results   Component Value Date     (H) 09/17/2021             Passed - ALT in past 12 months        Passed - Last ALT < 80     Lab Results   Component Value Date    ALT 19 02/02/2023             Passed - In person appointment or virtual visit in the past 12 mos or appointment in next 3 mos     Recent Outpatient Visits              1 month ago Primary hypertension    Tavon Ferrara MD    Office Visit    6 months ago Personal history of colonic Maria T Appiah, Eulalia Morales MD    Office Visit    9 months ago [facility] annual wellness visit, subsequent    Tavon Ferrara MD    Office Visit    1 year ago Herpes zoster with complication    Tavon Ferrara MD    Office Visit    2 years ago [facility] annual wellness visit, subsequent    Tavon Ferrara MD    Office Visit

## 2024-04-23 ENCOUNTER — OFFICE VISIT (OUTPATIENT)
Dept: INTERNAL MEDICINE CLINIC | Facility: CLINIC | Age: 74
End: 2024-04-23
Payer: MEDICARE

## 2024-04-23 VITALS
DIASTOLIC BLOOD PRESSURE: 86 MMHG | SYSTOLIC BLOOD PRESSURE: 132 MMHG | HEART RATE: 84 BPM | HEIGHT: 66 IN | WEIGHT: 173 LBS | BODY MASS INDEX: 27.8 KG/M2

## 2024-04-23 DIAGNOSIS — Z00.00 ENCOUNTER FOR ANNUAL HEALTH EXAMINATION: ICD-10-CM

## 2024-04-23 DIAGNOSIS — Z12.31 VISIT FOR SCREENING MAMMOGRAM: ICD-10-CM

## 2024-04-23 DIAGNOSIS — E78.5 HYPERLIPIDEMIA, UNSPECIFIED HYPERLIPIDEMIA TYPE: ICD-10-CM

## 2024-04-23 DIAGNOSIS — Z00.00 MEDICARE ANNUAL WELLNESS VISIT, SUBSEQUENT: Primary | ICD-10-CM

## 2024-04-23 DIAGNOSIS — D22.9 NUMEROUS MOLES: ICD-10-CM

## 2024-04-23 RX ORDER — ATORVASTATIN CALCIUM 20 MG/1
20 TABLET, FILM COATED ORAL NIGHTLY
Qty: 90 TABLET | Refills: 3 | Status: SHIPPED | OUTPATIENT
Start: 2024-04-23

## 2024-04-24 PROBLEM — Z86.010 HISTORY OF COLON POLYPS: Status: RESOLVED | Noted: 2023-08-14 | Resolved: 2024-04-24

## 2024-04-24 PROBLEM — Z86.0100 HISTORY OF COLON POLYPS: Status: RESOLVED | Noted: 2023-08-14 | Resolved: 2024-04-24

## 2024-04-24 NOTE — PROGRESS NOTES
Subjective:   Ela Mckeon is a 73 year old female who presents for a Medicare Subsequent Annual Wellness visit (Pt already had Initial Annual Wellness) and scheduled follow up of multiple significant but stable problems.         History/Other:   Fall Risk Assessment:   She has been screened for Falls and is low risk.      Cognitive Assessment:   She had a completely normal cognitive assessment - see flowsheet entries     Functional Ability/Status:   Ela Mckeon has a completely normal functional assessment. See flowsheet for details.      Depression Screening (PHQ-2/PHQ-9): PHQ-2 SCORE: 0  , done 4/23/2024            Advanced Directives:   She does NOT have a Living Will. [Do you have a living will?: No]  She does NOT have a Power of  for Health Care. [Do you have a healthcare power of ?: No]  Discussed Advance Care Planning with patient (and family/surrogate if present). Standard forms made available to patient in After Visit Summary.      Patient Active Problem List   Diagnosis    Age-related osteoporosis without current pathological fracture    Goiter    History of colon polyps     Allergies:  She has No Known Allergies.    Current Medications:  Outpatient Medications Marked as Taking for the 4/23/24 encounter (Office Visit) with Nigel Downs MD   Medication Sig    atorvastatin 20 MG Oral Tab Take 1 tablet (20 mg total) by mouth nightly.    Naproxen Sodium (FLANAX PAIN RELIEF OR) Take by mouth.       Medical History:  She  has a past medical history of Arthritis (2011), Bell's palsy (2018), Colon adenomas (10/13/2020), High blood pressure, High cholesterol, and Hyperlipidemia (2005).  Surgical History:  She  has a past surgical history that includes colonoscopy (12/2000); colonoscopy; colonoscopy (N/A, 10/13/2020); and colonoscopy (N/A, 8/14/2023).   Family History:  Her family history includes Heart Disorder in her father; Hypertension in her sister; Renal Disease in her  mother.  Social History:  She  reports that she has never smoked. She has never been exposed to tobacco smoke. She has never used smokeless tobacco. She reports that she does not drink alcohol and does not use drugs.    Tobacco:  She has never smoked tobacco.    CAGE Alcohol Screen:   CAGE screening score of 0 on 4/23/2024, showing low risk of alcohol abuse.      Patient Care Team:  Nigel Downs MD as PCP - General (Internal Medicine)    Review of Systems   Constitutional:  Negative for activity change, chills, fatigue and fever.   HENT:  Negative for ear discharge, nosebleeds, postnasal drip, rhinorrhea, sinus pressure and sore throat.    Eyes:  Negative for pain, discharge and redness.   Respiratory:  Negative for cough, chest tightness, shortness of breath and wheezing.    Cardiovascular:  Negative for chest pain, palpitations and leg swelling.   Gastrointestinal:  Negative for abdominal pain, blood in stool, constipation, diarrhea, nausea and vomiting.   Genitourinary:  Negative for difficulty urinating, dysuria, frequency, hematuria and urgency.   Musculoskeletal:  Negative for back pain, gait problem and joint swelling.   Skin:  Negative for rash.   Neurological:  Negative for syncope, weakness, light-headedness and headaches.   Psychiatric/Behavioral:  Negative for dysphoric mood. The patient is not nervous/anxious.          Objective:   Physical Exam  Constitutional:       Appearance: She is well-developed. She is not ill-appearing.   HENT:      Right Ear: Ear canal normal.      Left Ear: Ear canal normal.      Mouth/Throat:      Pharynx: Oropharynx is clear.   Eyes:      Extraocular Movements: Extraocular movements intact.      Conjunctiva/sclera: Conjunctivae normal.      Pupils: Pupils are equal, round, and reactive to light.   Cardiovascular:      Rate and Rhythm: Normal rate and regular rhythm.      Heart sounds: Normal heart sounds.   Pulmonary:      Effort: Pulmonary effort is normal.      Breath  sounds: Normal breath sounds.   Abdominal:      General: Bowel sounds are normal.      Palpations: Abdomen is soft.   Skin:     General: Skin is warm and dry.   Neurological:      Mental Status: She is alert.   Psychiatric:         Mood and Affect: Mood normal.           /86   Pulse 84   Ht 5' 6\" (1.676 m)   Wt 173 lb (78.5 kg)   BMI 27.92 kg/m²  Estimated body mass index is 27.92 kg/m² as calculated from the following:    Height as of this encounter: 5' 6\" (1.676 m).    Weight as of this encounter: 173 lb (78.5 kg).    Medicare Hearing Assessment:   Hearing Screening    Entry User: Ailin Fry CMA  Screening Method: Questionnaire  I have a problem hearing over the telephone: No I have trouble following the conversations when two or more people are talking at the same time: No   I have trouble understanding things on the TV: No I have to strain to understand conversations: No   I have to worry about missing the telephone ring or doorbell: No I have trouble hearing conversations in a noisy background such as a crowded room or restaurant: No   I get confused about where sounds come from: No I misunderstand some words in a sentence and need to ask people to repeat themselves: Sometimes   I especially have trouble understanding the speech of women and children: No I have trouble understanding the speaker in a large room such as at a meeting or place of [employer]: No   Many people I talk to seem to mumble (or don't speak clearly): Sometimes People get annoyed because I misunderstand what they say: No   I misunderstand what others are saying and make inappropriate responses: No I avoid social activities because I cannot hear well and fear I will reply improperly: No   Family members and friends have told me they think I may have hearing loss: No                 Assessment & Plan:   Ela Mckeon is a 73 year old female who presents for a Medicare Assessment.     (Z00.00) Medicare annual wellness visit,  subsequent  (primary encounter diagnosis)  Plan: Patient is independent with [employer]    Health screening   Colonoscopy, mammography, dexa scan  And routine eye exam advised.      (E78.5) Hyperlipidemia, unspecified hyperlipidemia type  Plan: CBC With Differential With Platelet, Comp         Metabolic Panel (14), Lipid Panel, TSH and Free        T4      Low cholesterol diet advised  Avoid saturated and trans fats     (Z12.31) Visit for screening mammogram  Plan: [facility]ENDA 2D+3D SCREENING BILAT         (CPT=77067/64104)        .Breast exam.  Bilateral  No discrete palpable masses or tenderness    No nipple discharge  And no axillary adenopathy.  Self breast exam advised      (D22.9) Numerous moles  Plan: DERM - INTERNAL        Referral given    Goiter stable  followed by endo  Last US    Impression   CONCLUSION:  1. Stable benign appearing thyroid nodules.   6/14/2021      Patient voiced understanding  and agrees with plan        The patient indicates understanding of these issues and agrees to the plan.  Reinforced healthy diet, lifestyle, and exercise.      No follow-ups on file.     Nigel Downs MD, 4/24/2024     Supplementary Documentation:   General Health:  In the past six months, have you lost more than 10 pounds without trying?: 2 - No  Has your appetite been poor?: No  Type of Diet: Other  How does the patient maintain a good energy level?: Other  How would you describe your daily physical activity?: Light  How would you describe your current health state?: Good  On a scale of 0 to 10, with 0 being no pain and 10 being severe pain, what is your pain level?: 1 - (Mild)  In the past six months, have you experienced urine leakage?: 0-No  At any time do you feel concerned for the safety/well-being of yourself and/or your children, in your home or elsewhere?: No  Have you had any immunizations at another office such as Influenza, Hepatitis B, Tetanus, or Pneumococcal?: No       Ela Y Mike's SCREENING SCHEDULE    Tests on this list are recommended by your physician but may not be covered, or covered at this frequency, by your insurer.   Please check with your insurance carrier before scheduling to verify coverage.   PREVENTATIVE SERVICES FREQUENCY &  COVERAGE DETAILS LAST COMPLETION DATE   Diabetes Screening    Fasting Blood Sugar /  Glucose    One screening every 12 months if never tested or if previously tested but not diagnosed with pre-diabetes   One screening every 6 months if diagnosed with pre-diabetes Lab Results   Component Value Date    GLU 97 02/02/2023        Cardiovascular Disease Screening    Lipid Panel  Cholesterol  Lipoprotein (HDL)  Triglycerides Covered every 5 years for all Medicare beneficiaries without apparent signs or symptoms of cardiovascular disease Lab Results   Component Value Date    CHOLEST 186 09/17/2021    HDL 41 (L) 09/17/2021     (H) 09/17/2021    TRIG 224 (H) 09/17/2021         Electrocardiogram (EKG)   Covered if needed at Welcome to Medicare, and non-screening if indicated for medical reasons -      Ultrasound Screening for Abdominal Aortic Aneurysm (AAA) Covered once in a lifetime for one of the following risk factors    Men who are 65-75 years old and have ever smoked    Anyone with a family history -     Colorectal Cancer Screening  Covered for ages 50-85; only need ONE of the following:    Colonoscopy   Covered every 10 years    Covered every 2 years if patient is at high risk or previous colonoscopy was abnormal 08/14/2023    Health Maintenance   Topic Date Due    Colorectal Cancer Screening  08/14/2026       Flexible Sigmoidoscopy   Covered every 4 years -    Fecal Occult Blood Test Covered annually -   Bone Density Screening    Bone density screening    Covered every 2 years after age 65 if diagnosed with risk of osteoporosis or estrogen deficiency.    Covered yearly for long-term glucocorticoid medication use (Steroids) Last Dexa Scan:    XR DEXA BONE DENSITOMETRY  (CPT=77080) 01/18/2023      No recommendations at this time   Pap and Pelvic    Pap   Covered every 2 years for women at normal risk; Annually if at high risk -  No recommendations at this time    Chlamydia Annually if high risk -  No recommendations at this time   Screening Mammogram    Mammogram     Recommend annually for all female patients aged 40 and older    One baseline mammogram covered for patients aged 35-39 01/18/2023    Health Maintenance   Topic Date Due    Mammogram  01/18/2024       Immunizations    Influenza Covered once per flu season  Please get every year 11/02/2023  No recommendations at this time    Pneumococcal Each vaccine (Vegmcfc21 & [id number]) covered once after 65 Prevnar 13: -    [id number]: 01/31/2020     No recommendations at this time    Hepatitis B One screening covered for patients with certain risk factors   -  No recommendations at this time    Tetanus Toxoid Not covered by Medicare Part B unless medically necessary (cut with metal); may be covered with your pharmacy prescription benefits -    Tetanus, Diptheria and Pertusis TD and TDaP Not covered by Medicare Part B -  No recommendations at this time    Zoster Not covered by Medicare Part B; may be covered with your pharmacy  prescription benefits -  Zoster Vaccines(1 of 2) Never done     Annual Monitoring of Persistent Medications (ACE/ARB, digoxin diuretics, anticonvulsants)    Potassium Annually Lab Results   Component Value Date    K 4.6 02/02/2023         Creatinine   Annually Lab Results   Component Value Date    CREATSERUM 0.86 02/02/2023         BUN Annually Lab Results   Component Value Date    BUN 15 02/02/2023       Drug Serum Conc Annually No results found for: \"DIGOXIN\", \"DIG\", \"VALP\"

## 2024-04-24 NOTE — PATIENT INSTRUCTIONS
Ela Mckeon's SCREENING SCHEDULE   Tests on this list are recommended by your physician but may not be covered, or covered at this frequency, by your insurer.   Please check with your insurance carrier before scheduling to verify coverage.   PREVENTATIVE SERVICES FREQUENCY &  COVERAGE DETAILS LAST COMPLETION DATE   Diabetes Screening    Fasting Blood Sugar /  Glucose    One screening every 12 months if never tested or if previously tested but not diagnosed with pre-diabetes   One screening every 6 months if diagnosed with pre-diabetes Lab Results   Component Value Date    GLU 97 02/02/2023        Cardiovascular Disease Screening    Lipid Panel  Cholesterol  Lipoprotein (HDL)  Triglycerides Covered every 5 years for all Medicare beneficiaries without apparent signs or symptoms of cardiovascular disease Lab Results   Component Value Date    CHOLEST 186 09/17/2021    HDL 41 (L) 09/17/2021     (H) 09/17/2021    TRIG 224 (H) 09/17/2021         Electrocardiogram (EKG)   Covered if needed at Welcome to Medicare, and non-screening if indicated for medical reasons -      Ultrasound Screening for Abdominal Aortic Aneurysm (AAA) Covered once in a lifetime for one of the following risk factors   • Men who are 65-75 years old and have ever smoked   • Anyone with a family history -     Colorectal Cancer Screening  Covered for ages 50-85; only need ONE of the following:    Colonoscopy   Covered every 10 years    Covered every 2 years if patient is at high risk or previous colonoscopy was abnormal 08/14/2023    Health Maintenance   Topic Date Due   • Colorectal Cancer Screening  08/14/2026       Flexible Sigmoidoscopy   Covered every 4 years -    Fecal Occult Blood Test Covered annually -   Bone Density Screening    Bone density screening    Covered every 2 years after age 65 if diagnosed with risk of osteoporosis or estrogen deficiency.    Covered yearly for long-term glucocorticoid medication use (Steroids) Last Dexa  Scan:    XR DEXA BONE DENSITOMETRY (CPT=77080) 01/18/2023      No recommendations at this time   Pap and Pelvic    Pap   Covered every 2 years for women at normal risk; Annually if at high risk -  No recommendations at this time    Chlamydia Annually if high risk -  No recommendations at this time   Screening Mammogram    Mammogram     Recommend annually for all female patients aged 40 and older    One baseline mammogram covered for patients aged 35-39 01/18/2023    Health Maintenance   Topic Date Due   • Mammogram  01/18/2024       Immunizations    Influenza Covered once per flu season  Please get every year 11/02/2023  No recommendations at this time    Pneumococcal Each vaccine ([id number] & Jefcujdfk56) covered once after 65 Prevnar 13: -    Rlloixvyk75: 01/31/2020     No recommendations at this time    Hepatitis B One screening covered for patients with certain risk factors   -  No recommendations at this time    Tetanus Toxoid Not covered by Medicare Part B unless medically necessary (cut with metal); may be covered with your pharmacy prescription benefits -    Tetanus, Diptheria and Pertusis TD and TDaP Not covered by Medicare Part B -  No recommendations at this time    Zoster Not covered by Medicare Part B; may be covered with your pharmacy  prescription benefits -  Zoster Vaccines(1 of 2) Never done     Annual Monitoring of Persistent Medications (ACE/ARB, digoxin diuretics, anticonvulsants)    Potassium Annually Lab Results   Component Value Date    K 4.6 02/02/2023         Creatinine   Annually Lab Results   Component Value Date    CREATSERUM 0.86 02/02/2023         BUN Annually Lab Results   Component Value Date    BUN 15 02/02/2023       Drug Serum Conc Annually No results found for: \"DIGOXIN\", \"DIG\", \"VALP\"

## 2024-04-27 ENCOUNTER — HOSPITAL ENCOUNTER (OUTPATIENT)
Dept: MAMMOGRAPHY | Facility: HOSPITAL | Age: 74
Discharge: HOME | End: 2024-04-27
Attending: INTERNAL MEDICINE
Payer: MEDICARE

## 2024-04-27 DIAGNOSIS — Z12.31 VISIT FOR SCREENING MAMMOGRAM: ICD-10-CM

## 2024-04-27 PROCEDURE — 77067 SCR MAMMO BI INCL CAD: CPT | Performed by: INTERNAL MEDICINE

## 2024-04-27 PROCEDURE — 77063 BREAST TOMOSYNTHESIS BI: CPT | Performed by: INTERNAL MEDICINE

## 2024-05-02 LAB
ABSOLUTE BASOPHILS: 33 CELLS/UL (ref 0–200)
ABSOLUTE EOSINOPHILS: 224 CELLS/UL (ref 15–500)
ABSOLUTE LYMPHOCYTES: 2343 CELLS/UL (ref 850–3900)
ABSOLUTE MONOCYTES: 436 CELLS/UL (ref 200–950)
ABSOLUTE NEUTROPHILS: 3564 CELLS/UL (ref 1500–7800)
ALBUMIN/GLOBULIN RATIO: 1.7 (CALC) (ref 1–2.5)
ALBUMIN: 4.7 G/DL (ref 3.6–5.1)
ALKALINE PHOSPHATASE: 96 U/L (ref 37–153)
ALT: 20 U/L (ref 6–29)
APPEARANCE: CLEAR
AST: 20 U/L (ref 10–35)
BASOPHILS: 0.5 %
BILIRUBIN, TOTAL: 0.5 MG/DL (ref 0.2–1.2)
BILIRUBIN: NEGATIVE
BUN: 11 MG/DL (ref 7–25)
CALCIUM: 9.7 MG/DL (ref 8.6–10.4)
CARBON DIOXIDE: 28 MMOL/L (ref 20–32)
CHLORIDE: 105 MMOL/L (ref 98–110)
CHOL/HDLC RATIO: 3.2 (CALC)
CHOLESTEROL, TOTAL: 178 MG/DL
COLOR: YELLOW
CREATININE: 0.83 MG/DL (ref 0.6–1)
EGFR: 74 ML/MIN/1.73M2
EOSINOPHILS: 3.4 %
GLOBULIN: 2.7 G/DL (CALC) (ref 1.9–3.7)
GLUCOSE: 103 MG/DL (ref 65–99)
GLUCOSE: NEGATIVE
HDL CHOLESTEROL: 56 MG/DL
HEMATOCRIT: 41.2 % (ref 35–45)
HEMOGLOBIN: 13.3 G/DL (ref 11.7–15.5)
KETONES: NEGATIVE
LDL-CHOLESTEROL: 95 MG/DL (CALC)
LEUKOCYTE ESTERASE: NEGATIVE
LYMPHOCYTES: 35.5 %
MCH: 27.9 PG (ref 27–33)
MCHC: 32.3 G/DL (ref 32–36)
MCV: 86.4 FL (ref 80–100)
MONOCYTES: 6.6 %
MPV: 11.8 FL (ref 7.5–12.5)
NEUTROPHILS: 54 %
NITRITE: NEGATIVE
NON-HDL CHOLESTEROL: 122 MG/DL (CALC)
OCCULT BLOOD: NEGATIVE
PH: 7.5 (ref 5–8)
PLATELET COUNT: 232 THOUSAND/UL (ref 140–400)
POTASSIUM: 4.7 MMOL/L (ref 3.5–5.3)
PROTEIN, TOTAL: 7.4 G/DL (ref 6.1–8.1)
RDW: 14 % (ref 11–15)
RED BLOOD CELL COUNT: 4.77 MILLION/UL (ref 3.8–5.1)
SODIUM: 140 MMOL/L (ref 135–146)
SPECIFIC GRAVITY: 1.02 (ref 1–1.03)
T4, FREE: 1 NG/DL (ref 0.8–1.8)
TRIGLYCERIDES: 177 MG/DL
TSH: 2.72 MIU/L (ref 0.4–4.5)
WHITE BLOOD CELL COUNT: 6.6 THOUSAND/UL (ref 3.8–10.8)

## 2024-05-23 ENCOUNTER — NURSE TRIAGE (OUTPATIENT)
Dept: INTERNAL MEDICINE CLINIC | Facility: CLINIC | Age: 74
End: 2024-05-23

## 2024-05-23 NOTE — TELEPHONE ENCOUNTER
Noted.       Future Appointments   Date Time Provider Department Center   5/28/2024  1:40 PM Nigel Downs MD [facility]   6/28/2024  4:15 PM Niya Matute MD [facility]

## 2024-05-23 NOTE — TELEPHONE ENCOUNTER
Action Requested: Summary for Provider     []  Critical Lab, Recommendations Needed  [] Need Additional Advice  [x]   FYI    []   Need Orders  [] Need Medications Sent to Pharmacy  []  Other     SUMMARY: Appointment for Tues 5/28, Dr Downs. Denies any dizziness since 2 weeks ago.     Reason for call: Dizziness - only one occurrence about 2 weeks. Few seconds. Vision went black. No falls, no loss of consciousness  Onset: 2 weeks ago     Patient called office. Date of birth and full name both confirmed.   Triaged and advised care advice   Patient reports that she had once episode of dizziness 2 weeks ago .  And that is has not occurred again ever since.     During dizzy episode 2 weeks ago - dizzy only for a few seconds. And vision went black, also only for a few seconds and then vision recovered.   Per patient - she did not fall, nor lose consciousness.     Evaluation advised due to age and concerning dizziness.     Offered appointment today with other providers.   Patient declines, and per patient, would prefer to wait and see Dr Downs.   Per patient, she is feeling okay for now, no symptoms.    Appointment made.  Tuesday Dr Downs.     Advised to monitor symptoms.  RN advised if symptoms get severely worse, patient should seek care at Emergency Room .   Patient verbalizes understanding and is agreeable to instructions.           Future Appointments   Date Time Provider Department Center   5/28/2024  1:40 PM Nigel Downs MD [facility]   6/28/2024  4:15 PM Niya Matute MD [facility]       Reason for Disposition   Dizziness caused by sudden or prolonged standing    Protocols used: Dizziness-A-OH

## 2024-05-28 ENCOUNTER — OFFICE VISIT (OUTPATIENT)
Dept: INTERNAL MEDICINE CLINIC | Facility: CLINIC | Age: 74
End: 2024-05-28

## 2024-05-28 VITALS
BODY MASS INDEX: 27.64 KG/M2 | HEART RATE: 87 BPM | HEIGHT: 66 IN | DIASTOLIC BLOOD PRESSURE: 91 MMHG | SYSTOLIC BLOOD PRESSURE: 162 MMHG | WEIGHT: 172 LBS

## 2024-05-28 DIAGNOSIS — I10 PRIMARY HYPERTENSION: ICD-10-CM

## 2024-05-28 DIAGNOSIS — R42 FEELING FAINT: Primary | ICD-10-CM

## 2024-05-28 DIAGNOSIS — R68.89 FORGETFULNESS: ICD-10-CM

## 2024-05-28 PROCEDURE — 3080F DIAST BP >= 90 MM HG: CPT | Performed by: INTERNAL MEDICINE

## 2024-05-28 PROCEDURE — 99214 OFFICE O/P EST MOD 30 MIN: CPT | Performed by: INTERNAL MEDICINE

## 2024-05-28 PROCEDURE — 1159F MED LIST DOCD IN RCRD: CPT | Performed by: INTERNAL MEDICINE

## 2024-05-28 PROCEDURE — 3077F SYST BP >= 140 MM HG: CPT | Performed by: INTERNAL MEDICINE

## 2024-05-28 PROCEDURE — 1126F AMNT PAIN NOTED NONE PRSNT: CPT | Performed by: INTERNAL MEDICINE

## 2024-05-28 PROCEDURE — 3008F BODY MASS INDEX DOCD: CPT | Performed by: INTERNAL MEDICINE

## 2024-05-28 RX ORDER — LOSARTAN POTASSIUM 50 MG/1
50 TABLET ORAL DAILY
Qty: 30 TABLET | Refills: 3 | Status: SHIPPED | OUTPATIENT
Start: 2024-05-28 | End: 2025-05-28

## 2024-05-28 NOTE — PROGRESS NOTES
HPI:    Patient ID: Ela Mckeon is a 73 year old female.    Dizziness  Associated symptoms include coughing. Pertinent negatives include no abdominal pain, chest pain, chills, fatigue, fever, headaches, joint swelling, nausea, rash, sore throat, vomiting or weakness.     Felt [place]  May 5  felt faint    Forgot she had her daughter.s care  Concnerned re memory loss    MMSE scored today at 30/30    BP (!) 162/91 (BP Location: Right arm, Patient Position: Sitting, Cuff Size: adult)   Pulse 87   Ht 5' 6\" (1.676 m)   Wt 172 lb (78 kg)   BMI 27.76 kg/m²   Wt Readings from Last 6 Encounters:   05/28/24 172 lb (78 kg)   04/23/24 173 lb (78.5 kg)   08/15/23 173 lb (78.5 kg)   08/14/23 160 lb (72.6 kg)   03/07/23 168 lb (76.2 kg)   12/06/22 173 lb (78.5 kg)     Body mass index is 27.76 kg/m².  HGBA1C:  No results found for: \"A1C\", \"EAG\"      Review of Systems   Constitutional:  Negative for activity change, appetite change, chills, fatigue and fever.   HENT:  Negative for ear discharge, nosebleeds, postnasal drip, rhinorrhea, sinus pressure and sore throat.    Eyes:  Negative for pain, discharge and redness.   Respiratory:  Positive for cough. Negative for chest tightness, shortness of breath (sometimes) and wheezing.    Cardiovascular:  Negative for chest pain, palpitations and leg swelling.   Gastrointestinal:  Negative for abdominal pain, blood in stool, constipation, diarrhea, nausea and vomiting.   Genitourinary:  Negative for difficulty urinating, dysuria, frequency, hematuria and urgency.   Musculoskeletal:  Negative for back pain, gait problem and joint swelling.   Skin:  Negative for rash.   Neurological:  Positive for dizziness. Negative for syncope, weakness, light-headedness and headaches.   Psychiatric/Behavioral:  Negative for dysphoric mood. The patient is not nervous/anxious.          Current Outpatient Medications   Medication Sig Dispense Refill    losartan 50 MG Oral Tab Take 1 tablet (50 mg total)  by mouth daily. 30 tablet 3    atorvastatin 20 MG Oral Tab Take 1 tablet (20 mg total) by mouth nightly. 90 tablet 3    Naproxen Sodium (FLANAX PAIN RELIEF OR) Take by mouth.       Allergies:No Known Allergies    HISTORY:  Past Medical History:    Arthritis    Bell's palsy    Colon adenomas    #3    Goiter    High blood pressure    High cholesterol    Hyperlipidemia      Past Surgical History:   Procedure Laterality Date    Colonoscopy  12/2000    Mirna    Colonoscopy      [facility]    Colonoscopy N/A 10/13/2020    Procedure: COLONOSCOPY;  Surgeon: Jered Green MD;  Location: [facility]    Colonoscopy N/A 8/14/2023    Procedure: COLONOSCOPY;  Surgeon: Tavon Rice MD;  Location: [facility]      Family History   Problem Relation Age of Onset    Heart Disorder Father     Renal Disease Mother     Hypertension Sister     Breast Cancer Neg     Ovarian Cancer Neg       Social History:   Social History     Socioeconomic History    Marital status:    Tobacco Use    Smoking status: Never     Passive exposure: Never    Smokeless tobacco: Never   Vaping Use    Vaping status: Never Used   Substance and Sexual Activity    Alcohol use: Never    Drug use: Never   Other Topics Concern    Reaction to local anesthetic No        PHYSICAL EXAM:    Physical Exam  Constitutional:       Appearance: She is well-developed. She is not ill-appearing.   HENT:      Right Ear: Ear canal normal.      Left Ear: Ear canal normal.      Mouth/Throat:      Pharynx: Oropharynx is clear.   Eyes:      Extraocular Movements: Extraocular movements intact.      Conjunctiva/sclera: Conjunctivae normal.      Pupils: Pupils are equal, round, and reactive to light.   Cardiovascular:      Rate and Rhythm: Normal rate and regular rhythm.      Heart sounds: Normal heart sounds.   Pulmonary:      Effort: Pulmonary effort is normal.      Breath sounds: Normal breath sounds.   Abdominal:      General: Bowel sounds are normal.      Palpations:  Abdomen is soft.   Skin:     General: Skin is warm and dry.   Neurological:      Mental Status: She is alert and oriented to person, place, and time.      Cranial Nerves: No cranial nerve deficit.      Motor: No weakness.   Psychiatric:         Mood and Affect: Mood normal.         Behavior: Behavior normal.              ASSESSMENT/PLAN:     (R42) Feeling faint  (primary encounter diagnosis)  Plan: MRI BRAIN (CPT=70551), CARD ECHO 2D DOPPLER         (CPT=93306), US VASCULAR CAROTID DOPPLER RIGHT         (CPT=93882), EKG 12 Lead     TIA  The 10-year ASCVD risk score (Anamika BENNETT, et al., 2019) is: 25.7%    Values used to calculate the score:      Age: 73 years      Sex: Female      Is Non- : No      Diabetic: No      Tobacco smoker: No      Systolic Blood Pressure: 162 mmHg      Is BP treated: Yes      HDL Cholesterol: 56 mg/dL      Total Cholesterol: 178 mg/dL    At high risk for ASCVD  Work up ordered      (I10) Primary hypertension  Plan: Basic Metabolic Panel (8)        BP (!) 162/91 (BP Location: Right arm, Patient Position: Sitting, Cuff Size: adult)   Pulse 87   Ht 5' 6\" (1.676 m)   Wt 172 lb (78 kg)   BMI 27.76 kg/m²   Uncontrolled  Rx losartan 50 mg po every day  Did not tolerate amlodipine    (R68.89) Forgetfulness  Plan: mmse 30/30 she did well  Reassurance given    Medications and most recent test results reviewed  Refill medicaitons  as needed  Potential side effect discussed  Modification of risk for CAD advised    Dietary an lifestyle change  Pt voiced understanding and agrees with plan  Pt given time to ask questions  After Visit Summary handout    Discussed  And given to patient.    Low salt diet advised  Monitor blood pressure daily and record  Follow up in a month          Meds This Visit:  Requested Prescriptions     Signed Prescriptions Disp Refills    losartan 50 MG Oral Tab 30 tablet 3     Sig: Take 1 tablet (50 mg total) by mouth daily.       Imaging & Referrals:  MRI  BRAIN (VHO=81056)  US VASCULAR CAROTID DOPPLER RIGHT  (CPT=93882)  CARD ECHO 2D DOPPLER (CPT=93306)  EKG 12-LEAD        ID#[id number]

## 2024-06-12 ENCOUNTER — HOSPITAL ENCOUNTER (OUTPATIENT)
Dept: CV DIAGNOSTICS | Facility: HOSPITAL | Age: 74
Discharge: HOME | End: 2024-06-12
Attending: INTERNAL MEDICINE
Payer: MEDICARE

## 2024-06-12 DIAGNOSIS — R42 FEELING FAINT: ICD-10-CM

## 2024-06-12 PROCEDURE — 93306 TTE W/DOPPLER COMPLETE: CPT | Performed by: INTERNAL MEDICINE

## 2024-06-28 ENCOUNTER — OFFICE VISIT (OUTPATIENT)
Dept: DERMATOLOGY CLINIC | Facility: CLINIC | Age: 74
End: 2024-06-28
Payer: MEDICARE

## 2024-06-28 DIAGNOSIS — D22.9 MULTIPLE NEVI: ICD-10-CM

## 2024-06-28 DIAGNOSIS — L82.1 SK (SEBORRHEIC KERATOSIS): Primary | ICD-10-CM

## 2024-06-28 DIAGNOSIS — L81.4 LENTIGO: ICD-10-CM

## 2024-06-28 DIAGNOSIS — L56.5 DSAP (DISSEMINATED SUPERFICIAL ACTINIC POROKERATOSIS): ICD-10-CM

## 2024-06-28 DIAGNOSIS — D23.9 BENIGN NEOPLASM OF SKIN, UNSPECIFIED LOCATION: ICD-10-CM

## 2024-06-28 PROCEDURE — 1126F AMNT PAIN NOTED NONE PRSNT: CPT | Performed by: DERMATOLOGY

## 2024-06-28 PROCEDURE — 99203 OFFICE O/P NEW LOW 30 MIN: CPT | Performed by: DERMATOLOGY

## 2024-06-28 PROCEDURE — 1159F MED LIST DOCD IN RCRD: CPT | Performed by: DERMATOLOGY

## 2024-06-28 PROCEDURE — 1160F RVW MEDS BY RX/DR IN RCRD: CPT | Performed by: DERMATOLOGY

## 2024-07-02 ENCOUNTER — HOSPITAL ENCOUNTER (OUTPATIENT)
Dept: MRI IMAGING | Age: 74
Discharge: HOME | End: 2024-07-02
Attending: INTERNAL MEDICINE
Payer: MEDICARE

## 2024-07-02 DIAGNOSIS — R42 FEELING FAINT: ICD-10-CM

## 2024-07-02 PROCEDURE — 70551 MRI BRAIN STEM W/O DYE: CPT | Performed by: INTERNAL MEDICINE

## 2024-07-07 NOTE — PROGRESS NOTES
Ela Mckeon is a 73 year old female.  HPI:     CC:    Chief Complaint   Patient presents with    Full Skin Exam     LOV 05/20. Pt denies any hx of skin CA. Pt present for full body exam with multiple moles of concern on her face arms and legs. No major concern just wants them looked at.            HISTORY:    Past Medical History:    Arthritis    Bell's palsy    Colon adenomas    #3    Goiter    High blood pressure    High cholesterol    Hyperlipidemia      Past Surgical History:   Procedure Laterality Date    Colonoscopy  12/2000    Mirna    Colonoscopy      [facility]    Colonoscopy N/A 10/13/2020    Procedure: COLONOSCOPY;  Surgeon: Jered Green MD;  Location: [facility] ENDO    Colonoscopy N/A 8/14/2023    Procedure: COLONOSCOPY;  Surgeon: Tavon Rice MD;  Location: [facility] ENDO      Family History   Problem Relation Age of Onset    Heart Disorder Father     Renal Disease Mother     Hypertension Sister     Breast Cancer Neg     Ovarian Cancer Neg       Social History     Socioeconomic History    Marital status:    Tobacco Use    Smoking status: Never     Passive exposure: Never    Smokeless tobacco: Never   Vaping Use    Vaping status: Never Used   Substance and Sexual Activity    Alcohol use: Never    Drug use: Never   Other Topics Concern    Breast feeding No    Reaction to local anesthetic No    Pt has a pacemaker No    Pt has a defibrillator No        Current Outpatient Medications   Medication Sig Dispense Refill    losartan 50 MG Oral Tab Take 1 tablet (50 mg total) by mouth daily. 30 tablet 3    atorvastatin 20 MG Oral Tab Take 1 tablet (20 mg total) by mouth nightly. 90 tablet 3    Naproxen Sodium (FLANAX PAIN RELIEF OR) Take by mouth.       Allergies:   No Known Allergies    Past Medical History:    Arthritis    Bell's palsy    Colon adenomas    #3    Goiter    High blood pressure    High cholesterol    Hyperlipidemia     Past Surgical History:   Procedure Laterality Date    Colonoscopy   12/2000    Mirna    Colonoscopy      [facility]    Colonoscopy N/A 10/13/2020    Procedure: COLONOSCOPY;  Surgeon: Jered Green MD;  Location: [facility] ENDO    Colonoscopy N/A 8/14/2023    Procedure: COLONOSCOPY;  Surgeon: Tavon Rice MD;  Location: [facility] ENDO     Social History     Socioeconomic History    Marital status:      Spouse name: Not on file    Number of children: Not on file    Years of education: Not on file    Highest education level: Not on file   Occupational History    Not on file   Tobacco Use    Smoking status: Never     Passive exposure: Never    Smokeless tobacco: Never   Vaping Use    Vaping status: Never Used   Substance and Sexual Activity    Alcohol use: Never    Drug use: Never    Sexual activity: Not on file   Other Topics Concern    Grew up on a farm Not Asked    History of tanning Not Asked    Outdoor occupation Not Asked    Breast feeding No    Reaction to local anesthetic No    Pt has a pacemaker No    Pt has a defibrillator No   Social History Narrative    Not on file     Social Determinants of Health     Financial Resource Strain: Not on file   Food Insecurity: Not on file   Transportation Needs: Not on file   Physical Activity: Not on file   Stress: Not on file   Social Connections: Not on file   Housing Stability: Not on file     Family History   Problem Relation Age of Onset    Heart Disorder Father     Renal Disease Mother     Hypertension Sister     Breast Cancer Neg     Ovarian Cancer Neg        There were no vitals filed for this visit.    HPI:  Chief Complaint   Patient presents with    Full Skin Exam     LOV 05/20. Pt denies any hx of skin CA. Pt present for full body exam with multiple moles of concern on her face arms and legs. No major concern just wants them looked at.      No personal  or family history of skin cancer      Patient presents with concerns above.    Patient has been in their usual state of health.     Past notes/ records and  appropriate/relevant lab results including pathology and past body maps reviewed. Including outside notes/ PCP notes as appropriate. Updated and new information noted in current visit.     ROS:  Denies other relevant systemic complaints.      History, medications, allergies reviewed as noted.    Allergies:  Patient has no known allergies.     Physical Examination:     Well-developed well-nourished patient alert oriented in no acute distress.  Exam performed, including scalp, head, neck, face,nails, hair, external eyes, including conjunctival mucosa, eyelids, lips external ears , arms, digits,palms.     Multiple light to medium brown, well marginated, uniformly pigmented, macules and papules 6 mm and less scattered on exam. pigmented lesions examined with dermoscopy benign-appearing patterns.     Waxy tannish keratotic papules scattered, cherry-red vascular papules scattered.    See map today's date for lesions noted .  See assessment and plan below for specific lesions.    Otherwise remarkable for lesions as noted on map.    See A/P  below for additional information:    Assessment / plan:    No orders of the defined types were placed in this encounter.      Meds & Refills for this Visit:  Requested Prescriptions      No prescriptions requested or ordered in this encounter         Encounter Diagnoses   Name Primary?    SK (seborrheic keratosis) Yes    Lentigo     Multiple nevi     Benign neoplasm of skin, unspecified location     DSAP (disseminated superficial actinic porokeratosis)        Extensive lesions DSA P like  Alphahydroxy acids including lactic acid, glycolic acid, urea, salicylic acid may be helpful.  Suggest over-the-counter products such as Goldbond psoriasis cream 3% salicylic acid, rough and bumpy, Cerave SA cream, AmLactin, and others as available with these ingredients.  Pathophysiology discussed with patient.  Therapeutic options reviewed.  See  Medications in grid.  Instructions reviewed at  length.    Dermatofibroma left leg reassurance    Darker lentigines over the arms, in particular left forearm monitor    Lentigines SKs over the face reassurance    Benign-appearing nevi, no atypical features on dermoscopy reassurance given monitor.     Waxy tan keratotic papules lesions in areas of concern as noted reassurance given.  Benign nature discussed.  Possibility of cryo, alphahydroxy acids over-the-counter retinol's discussed.     No other susupicious lesions on todays  exam.    Please refer to map for specific lesions.  See additional diagnoses.  Pros cons of various therapies, risks benefits discussed.Pathophysiology discussed with patient.  Therapeutic options reviewed.  See  Medications in grid.  Instructions reviewed at length.    Benign nevi, seborrheic  keratoses, cherry angiomas:  Reassurance regarding other benign skin lesions.Signs and symptoms of skin cancer, ABCDE's of melanoma discussed with patient. Sunscreen (broad-spectrum, ideally mineral-based-UVA/UVB -SPF 30 or higher) use encouraged, sun protection/sun protective clothing, self exams reviewed Followup as noted RTC ---routine checkup    6 mos -one year or p.r.n.    Encounter Times   Including precharting, reviewing chart, prior notes obtaining history: 10 minutes, medical exam :10 minutes, notes on body map, plan, counseling 10minutes My total time spent caring for the patient on the day of the encounter: 30 minutes     The patient indicates understanding of these issues and agrees to the plan.  The patient is asked to return as noted in follow-up/ above.    This note was generated using Dragon voice recognition software.  Please contact me regarding any confusion resulting from errors in recognition..  Note to patient and family: The 21st Century Cures Act makes medical notes like these available to patients. However, be advised this is a medical document. It is intended as znpr-bi-ebig communication and monitoring of a patient's care  needs. It is written in medical language and may contain abbreviations or verbiage that are unfamiliar. It may appear blunt or direct. Medical documents are intended to carry relevant information, facts as evident and the clinical opinion of the practitioner.

## 2024-09-04 RX ORDER — LOSARTAN POTASSIUM 50 MG/1
50 TABLET ORAL DAILY
Qty: 90 TABLET | Refills: 3 | Status: SHIPPED | OUTPATIENT
Start: 2024-09-04 | End: 2025-09-04

## 2024-09-04 NOTE — TELEPHONE ENCOUNTER
Please Review. Protocol Failed; No Protocol   BP Readings from Last 1 Encounters:   05/28/24 (!) 162/91     Requested Prescriptions   Pending Prescriptions Disp Refills    losartan 50 MG Oral Tab 30 tablet 3     Sig: Take 1 tablet (50 mg total) by mouth daily.       Hypertension Medications Protocol Failed - 9/1/2024 11:14 AM        Failed - Last BP reading less than 140/90     BP Readings from Last 1 Encounters:   05/28/24 (!) 162/91               Passed - CMP or BMP in past 12 months        Passed - In person appointment or virtual visit in the past 12 mos or appointment in next 3 mos     Recent Outpatient Visits              2 months ago SK (seborrheic keratosis)    [facility]    [facility]    3 [facility]    [facility]    4 months ago Medicare annual wellness visit, subsequent    [facility]    Office Visit    1 year ago Primary hypertension    [facility]    [facility] Visit    1 year ago Personal history of colonic polyps    [facility], Tavon Goode MD    Office Visit                      Passed - EGFRCR or GFRNAA > 50     GFR Evaluation  EGFRCR: 74 , resulted on 5/1/2024                   Recent Outpatient Visits              2 months ago SK (seborrheic keratosis)    [facility]    [facility]    3 months ago [facility]    [facility]    4 months ago Medicare annual wellness visit, subsequent    [facility]    Office Visit    1 year ago Primary hypertension     [facility], Nigel Redding MD    Office Visit    1 year ago Personal history of colonic polyps    [facility], Tavon Goode MD    Office Visit

## 2024-09-07 RX ORDER — AMLODIPINE BESYLATE 5 MG/1
5 TABLET ORAL DAILY
Qty: 30 TABLET | Refills: 0 | OUTPATIENT
Start: 2024-09-07

## 2024-09-18 ENCOUNTER — APPOINTMENT (OUTPATIENT)
Dept: GENERAL RADIOLOGY | Facility: HOSPITAL | Age: 74
End: 2024-09-18
Attending: EMERGENCY MEDICINE
Payer: MEDICARE

## 2024-09-18 ENCOUNTER — HOSPITAL ENCOUNTER (EMERGENCY)
Facility: HOSPITAL | Age: 74
Discharge: HOME | End: 2024-09-18
Attending: EMERGENCY MEDICINE
Payer: MEDICARE

## 2024-09-18 ENCOUNTER — APPOINTMENT (OUTPATIENT)
Dept: GENERAL RADIOLOGY | Facility: HOSPITAL | Age: 74
End: 2024-09-18
Payer: MEDICARE

## 2024-09-18 VITALS
BODY MASS INDEX: 27 KG/M2 | OXYGEN SATURATION: 98 % | WEIGHT: 165 LBS | HEART RATE: 77 BPM | SYSTOLIC BLOOD PRESSURE: 147 MMHG | DIASTOLIC BLOOD PRESSURE: 71 MMHG | TEMPERATURE: 98 F | RESPIRATION RATE: 16 BRPM

## 2024-09-18 DIAGNOSIS — S52.532A CLOSED COLLES' FRACTURE OF LEFT RADIUS, INITIAL ENCOUNTER: Primary | ICD-10-CM

## 2024-09-18 PROCEDURE — 96372 THER/PROPH/DIAG INJ SC/IM: CPT

## 2024-09-18 PROCEDURE — 99284 EMERGENCY DEPT VISIT MOD MDM: CPT

## 2024-09-18 PROCEDURE — 25605 CLTX DST RDL FX/EPHYS SEP W/: CPT

## 2024-09-18 PROCEDURE — 73110 X-RAY EXAM OF WRIST: CPT

## 2024-09-18 PROCEDURE — 73100 X-RAY EXAM OF WRIST: CPT | Performed by: EMERGENCY MEDICINE

## 2024-09-18 RX ORDER — MORPHINE SULFATE 4 MG/ML
4 INJECTION, SOLUTION INTRAMUSCULAR; INTRAVENOUS ONCE
Status: DISCONTINUED | OUTPATIENT
Start: 2024-09-18 | End: 2024-09-18

## 2024-09-18 RX ORDER — MORPHINE SULFATE 4 MG/ML
4 INJECTION, SOLUTION INTRAMUSCULAR; INTRAVENOUS ONCE
Status: COMPLETED | OUTPATIENT
Start: 2024-09-18 | End: 2024-09-18

## 2024-09-18 RX ORDER — KETOROLAC TROMETHAMINE 30 MG/ML
30 INJECTION, SOLUTION INTRAMUSCULAR; INTRAVENOUS ONCE
Status: COMPLETED | OUTPATIENT
Start: 2024-09-18 | End: 2024-09-18

## 2024-09-19 NOTE — ED PROVIDER NOTES
Patient Seen in: [facility] Emergency Department    History     Chief Complaint   Patient presents with    Fall    Arm or Hand Injury       HPI    The patient presents to the ED complaining of pain in her left wrist.  She states that she fell and landed on her left wrist and has had pain and swelling of the wrist since.  Denies other injury in the fall.  No other complaints.    History reviewed.   Past Medical History:    Arthritis    Bell's palsy    Colon adenomas    #3    Goiter    High blood pressure    High cholesterol    Hyperlipidemia       History reviewed.   Past Surgical History:   Procedure Laterality Date    Colonoscopy  12/2000    Mirna    Colonoscopy      [facility]    Colonoscopy N/A 10/13/2020    Procedure: COLONOSCOPY;  Surgeon: Jered Green MD;  Location: [facility] ENDO    Colonoscopy N/A 8/14/2023    Procedure: COLONOSCOPY;  Surgeon: Tavon Rice MD;  Location: [facility] ENDO         Medications :  (Not in a hospital admission)       Family History   Problem Relation Age of Onset    Heart Disorder Father     Renal Disease Mother     Hypertension Sister     Breast Cancer Neg     Ovarian Cancer Neg        Smoking Status:   Social History     Socioeconomic History    Marital status:    Tobacco Use    Smoking status: Never     Passive exposure: Never    Smokeless tobacco: Never   Vaping Use    Vaping status: Never Used   Substance and Sexual Activity    Alcohol use: Never    Drug use: Never   Other Topics Concern    Breast feeding No    Reaction to local anesthetic No    Pt has a pacemaker No    Pt has a defibrillator No       Constitutional and vital signs reviewed.      Social History and Family History elements reviewed from today, pertinent positives to the presenting problem noted.    Physical Exam     ED Triage Vitals [09/18/24 1655]   /75   Pulse 77   Resp 20   Temp 97.8 °F (36.6 °C)   Temp src Temporal   SpO2 97 %   O2 Device None (Room air)       All measures to  prevent infection transmission during my interaction with the patient were taken. Handwashing was performed prior to and after the exam.  Stethoscope and any equipment used during my examination was cleaned with super sani-cloth germicidal wipes following the exam.     Physical Exam  Vitals and nursing note reviewed.   Constitutional:       General: She is not in acute distress.     Appearance: She is well-developed. She is ill-appearing. She is not toxic-appearing.   HENT:      Head: Normocephalic and atraumatic.   Neck:      Trachea: No tracheal deviation.   Cardiovascular:      Rate and Rhythm: Normal rate.      Pulses: Normal pulses.   Pulmonary:      Effort: Pulmonary effort is normal. No respiratory distress.   Musculoskeletal:         General: Tenderness and deformity present.      Comments: Tenderness and obvious bony deformity to the left distal radius.  Normal strength and sensation in the left fingers and normal left radial pulse.   Skin:     General: Skin is warm and dry.   Neurological:      Mental Status: She is alert and oriented to person, place, and time.   Psychiatric:         Mood and Affect: Mood normal.         Behavior: Behavior normal.         ED Course      Labs Reviewed - No data to display    As Interpreted by me    Imaging Results Available and Reviewed while in ED: XR WRIST COMPLETE (MIN 3 VIEWS), LEFT (CPT=73110)    Result Date: 9/18/2024  CONCLUSION:   Comminuted intra-articular distal radial fracture with mild displacement and mild dorsal angulation.  Mildly displaced ulnar styloid process fracture.    Dictated by (CST): Alec Hahn MD on 9/18/2024 at 5:27 PM     Finalized by (CST): Alec Hahn MD on 9/18/2024 at 5:28 PM           PROCEDURE: XR WRIST (2 VIEWS), LEFT (CPT=73100)      COMPARISON: [facility], XR WRIST COMPLETE (MIN 3 VIEWS), LEFT (CPT=73110), 9/18/2024, 5:11 PM.      INDICATIONS: post-reduction      TECHNIQUE: Three-view   Impression:       Status post reduction      Radial fracture is now in near anatomic alignment.  There is no longer any angulation.   Finalized by (CST): Forrest Lepe MD on 9/18/2024 at 8:40 PM          ED Medications Administered:   Medications   ketorolac (Toradol) 30 MG/ML injection 30 mg (30 mg Intramuscular Given 9/18/24 1932)   morphINE PF 4 MG/ML injection 4 mg (4 mg Intramuscular Given 9/18/24 1931)         MDM     Vitals:    09/18/24 1655 09/18/24 2038   BP: 127/75 147/71   Pulse: 77 77   Resp: 20 16   Temp: 97.8 °F (36.6 °C)    TempSrc: Temporal    SpO2: 97% 98%   Weight: 74.8 kg      *I personally reviewed and interpreted all ED vitals.    Pulse Ox: 98%, Room air, Normal     Differential Diagnosis/ Diagnostic Considerations: Wrist fracture, other    Complicating Factors: The patient already has does not have any pertinent problems on file. to contribute to the complexity of this ED evaluation.    Medical Decision Making  The patient presents to the ED for a left wrist injury.  Evidence for displaced wrist fracture on x-ray imaging.  Discussed reduction in the ED which patient is agreeable with.  Patient was given pain medication and her fracture is reduced and myself.  Repeat x-ray imaging confirms adequate reduction and patient placed in a short arm splint.  Stable for discharge with opiate follow-up.    Procedure:  Fracture reduction: After informed verbal consent explaining the risks and benefits, the left distal radius fracture was reduced by pulling distal traction and with fracture manipulation  without complications.  Post reduction the patient's neurovascular exam is normal.  Post reduction x-ray demonstrates reduction of the fracture to an acceptable anatomic position.   The procedure was performed by myself.    Procedure:  A left short arm splint was applied by an ED tech.  After application of the splint I returned and re-examined the patient.  The splint was adequately immobilizing the injured extremity, and  distal to the splint the patient's circulation and sensation was intact.        Problems Addressed:  Closed Colles' fracture of left radius, initial encounter: acute illness or injury    Amount and/or Complexity of Data Reviewed  Radiology: ordered and independent interpretation performed. Decision-making details documented in ED Course.     Details: I personally reviewed the patient's left x-ray images and noted a distal radius fracture    Risk  Parenteral controlled substances.  Minor surgery with identified risk factors.        Condition upon leaving the department: Stable    Disposition and Plan     Clinical Impression:  1. Closed Colles' fracture of left radius, initial encounter        Disposition:  Discharge    Follow-up:  Nigel Downs MD  [address] [place] [id number] [phone]    Schedule an appointment as soon as possible for a visit in 3 day(s)      Arun Lemons MD  [address] [place] [id number] [phone]    Schedule an appointment as soon as possible for a visit in 3 day(s)        Medications Prescribed:  Discharge Medication List as of 9/18/2024  8:33 PM

## 2024-09-20 ENCOUNTER — PATIENT OUTREACH (OUTPATIENT)
Dept: CASE MANAGEMENT | Age: 74
End: 2024-09-20

## 2024-09-20 ENCOUNTER — TELEPHONE (OUTPATIENT)
Dept: ORTHOPEDICS CLINIC | Facility: CLINIC | Age: 74
End: 2024-09-20

## 2024-09-20 ENCOUNTER — TELEPHONE (OUTPATIENT)
Dept: INTERNAL MEDICINE CLINIC | Facility: CLINIC | Age: 74
End: 2024-09-20

## 2024-09-20 ENCOUNTER — OFFICE VISIT (OUTPATIENT)
Dept: INTERNAL MEDICINE CLINIC | Facility: CLINIC | Age: 74
End: 2024-09-20

## 2024-09-20 VITALS
OXYGEN SATURATION: 98 % | HEART RATE: 82 BPM | SYSTOLIC BLOOD PRESSURE: 124 MMHG | DIASTOLIC BLOOD PRESSURE: 74 MMHG | WEIGHT: 169.81 LBS | HEIGHT: 66 IN | BODY MASS INDEX: 27.29 KG/M2

## 2024-09-20 DIAGNOSIS — S62.102D CLOSED FRACTURE OF LEFT WRIST WITH ROUTINE HEALING, SUBSEQUENT ENCOUNTER: Primary | ICD-10-CM

## 2024-09-20 PROCEDURE — 1125F AMNT PAIN NOTED PAIN PRSNT: CPT | Performed by: INTERNAL MEDICINE

## 2024-09-20 PROCEDURE — 1159F MED LIST DOCD IN RCRD: CPT | Performed by: INTERNAL MEDICINE

## 2024-09-20 PROCEDURE — 99213 OFFICE O/P EST LOW 20 MIN: CPT | Performed by: INTERNAL MEDICINE

## 2024-09-20 PROCEDURE — 3008F BODY MASS INDEX DOCD: CPT | Performed by: INTERNAL MEDICINE

## 2024-09-20 PROCEDURE — 3074F SYST BP LT 130 MM HG: CPT | Performed by: INTERNAL MEDICINE

## 2024-09-20 PROCEDURE — 3078F DIAST BP <80 MM HG: CPT | Performed by: INTERNAL MEDICINE

## 2024-09-20 RX ORDER — HYDROCODONE BITARTRATE AND ACETAMINOPHEN 7.5; 325 MG/1; MG/1
1 TABLET ORAL 2 TIMES DAILY PRN
Qty: 30 TABLET | Refills: 0 | Status: SHIPPED | OUTPATIENT
Start: 2024-09-20 | End: 2024-10-20

## 2024-09-20 NOTE — TELEPHONE ENCOUNTER
Beata triage nurse from Dr Downs office calling stating patient has a wrist fracture and was schedule with Dr Lemons requesting to know if she can be seen sooner than 9/30.Please advise     [phone]

## 2024-09-20 NOTE — PROGRESS NOTES
HPI:    Patient ID: Ela Mckeon is a 73 year old female.    HPI    Follow up   Wrist fracture  pain jannette at night  cannot sleep  To sched appt with ortho    The patient presents to the ED complaining of pain in her left wrist.  She states that she fell and landed on her left wrist and has had pain and swelling of the wrist since.  Denies other injury in the fall.  No other complaints.   R WRIST COMPLETE (MIN 3 VIEWS), LEFT (CPT=73110)     ONCLUSION:      Comminuted intra-articular distal radial fracture with mild displacement and mild dorsal angulation.      Mildly displaced ulnar styloid process fracture.            Dictated by (CST): Alec Hahn MD on 9/18/2024 at 5:27 PM         ROCEDURE: XR WRIST (2 VIEWS), LEFT (CPT=73100)      COMPARISON: [facility], XR WRIST COMPLETE (MIN 3 VIEWS), LEFT (CPT=73110), 9/18/2024, 5:11 PM.      INDICATIONS: post-reduction      TECHNIQUE: Three-view   Impression:      Status post reduction      Radial fracture is now in near anatomic alignment.  There is no longer any angulation.   Finalized by (CST): Forrest Lepe MD on 9/18/2024 at 8:40 PM        There were no vitals taken for this visit.  Wt Readings from Last 6 Encounters:   09/18/24 165 lb (74.8 kg)   05/28/24 172 lb (78 kg)   04/23/24 173 lb (78.5 kg)   08/15/23 173 lb (78.5 kg)   08/14/23 160 lb (72.6 kg)   03/07/23 168 lb (76.2 kg)     There is no height or weight on file to calculate BMI.  HGBA1C:  No results found for: \"A1C\", \"EAG\"      Review of Systems   Constitutional:  Negative for chills and fever.   Respiratory:  Negative for shortness of breath.    Cardiovascular:  Negative for chest pain.   Left wrist pain  Wrist in a cast and pt wearing arm sling      Current Outpatient Medications   Medication Sig Dispense Refill    losartan 50 MG Oral Tab Take 1 tablet (50 mg total) by mouth daily. 90 tablet 3    atorvastatin 20 MG Oral Tab Take 1 tablet (20 mg total) by mouth nightly. 90 tablet 3     Naproxen Sodium (FLANAX PAIN RELIEF OR) Take by mouth.       Allergies:No Known Allergies    HISTORY:  Past Medical History:    Arthritis    Bell's palsy    Colon adenomas    #3    Goiter    High blood pressure    High cholesterol    Hyperlipidemia      Past Surgical History:   Procedure Laterality Date    Colonoscopy  12/2000    Mirna    Colonoscopy      [facility]    Colonoscopy N/A 10/13/2020    Procedure: COLONOSCOPY;  Surgeon: Jered Green MD;  Location: [facility] ENDO    Colonoscopy N/A 8/14/2023    Procedure: COLONOSCOPY;  Surgeon: Tavon Rice MD;  Location: [facility]      Family History   Problem Relation Age of Onset    Heart Disorder Father     Renal Disease Mother     Hypertension Sister     Breast Cancer Neg     Ovarian Cancer Neg       Social History:   Social History     Socioeconomic History    Marital status:    Tobacco Use    Smoking status: Never     Passive exposure: Never    Smokeless tobacco: Never   Vaping Use    Vaping status: Never Used   Substance and Sexual Activity    Alcohol use: Never    Drug use: Never   Other Topics Concern    Breast feeding No    Reaction to local anesthetic No    Pt has a pacemaker No    Pt has a defibrillator No        PHYSICAL EXAM:    Physical Exam  Constitutional:       General: She is not in acute distress.     Appearance: She is not ill-appearing.   Cardiovascular:      Rate and Rhythm: Normal rate and regular rhythm.   Pulmonary:      Effort: Pulmonary effort is normal.      Breath sounds: Normal breath sounds.     Left arm in a sling left wrist wrapped  cast in lace         ASSESSMENT/PLAN:     (W64.976S) Closed fracture of left wrist with routine healing, subsequent encounter  (primary encounter diagnosis)  Plan: ORTHOPEDIC - INTERNAL,         HYDROcodone-acetaminophen (NORCO) 7.5-325 MG         Oral Tab     Side effect of med discussed  Sched appt with ortho  Prn ibuprofen    Patient voiced understanding  and agrees with plan            Meds  This Visit:  Requested Prescriptions      No prescriptions requested or ordered in this encounter       Imaging & Referrals:  None        ID#[id number]

## 2024-09-20 NOTE — TELEPHONE ENCOUNTER
Patient states tried to schedule with ortho for fractured hand and unable to get in until 9/30/24.  Patient states she is in pain and it will be hard to wait that long.    Forwarding to ortho for assist.

## 2024-09-20 NOTE — PROGRESS NOTES
Contacted pt for transitions of care, s/w pt briefly. Pt stated she could not talk right now and that she prefers to call back at a later time. NCM to try again at a later time if pt does not call back.

## 2024-09-20 NOTE — TELEPHONE ENCOUNTER
S/w patient- Booked her for 1:40 appointment on 9/23/24 with Rachel for wrist fracture follow up. She accepted and requested that I cancel the appointment on 9/30/24 with Dr Lemons

## 2024-09-23 ENCOUNTER — OFFICE VISIT (OUTPATIENT)
Dept: ORTHOPEDICS CLINIC | Facility: CLINIC | Age: 74
End: 2024-09-23
Payer: MEDICARE

## 2024-09-23 DIAGNOSIS — S52.532A CLOSED COLLES' FRACTURE OF LEFT RADIUS, INITIAL ENCOUNTER: Primary | ICD-10-CM

## 2024-09-23 NOTE — PROGRESS NOTES
NURSING INTAKE COMMENTS:   Chief Complaint   Patient presents with    Fracture     Consult- L wrist -  Pt had slip and fell on 09/18. Pt states pain is constant. Not able to go to sleep. Rates pain 10/10.   Numbness and swelling  in fingers.  Pt was sen in ED on 9/18, was put in temp splint and arm sling.        HPI: This 73 year old female presents today with complaints of left wrist pain.  She fell onto her outstretched left wrist.  She was seen in the emergency room where provisional reduction was performed and she was splinted.  She is right-hand dominant.    Past Medical History:    Arthritis    Bell's palsy    Colon adenomas    #3    Goiter    High blood pressure    High cholesterol    Hyperlipidemia     Past Surgical History:   Procedure Laterality Date    Colonoscopy  12/2000    Mirna    Colonoscopy      [facility]    Colonoscopy N/A 10/13/2020    Procedure: COLONOSCOPY;  Surgeon: Jered Green MD;  Location: [facility] ENDO    Colonoscopy N/A 8/14/2023    Procedure: COLONOSCOPY;  Surgeon: Tavon Rice MD;  Location: [facility] ENDO     Current Outpatient Medications   Medication Sig Dispense Refill    HYDROcodone-acetaminophen (NORCO) 7.5-325 MG Oral Tab Take 1 tablet by mouth 2 (two) times daily as needed for Pain. 30 tablet 0    losartan 50 MG Oral Tab Take 1 tablet (50 mg total) by mouth daily. 90 tablet 3    atorvastatin 20 MG Oral Tab Take 1 tablet (20 mg total) by mouth nightly. 90 tablet 3    Naproxen Sodium (FLANAX PAIN RELIEF OR) Take by mouth.       No Known Allergies  Family History   Problem Relation Age of Onset    Heart Disorder Father     Renal Disease Mother     Hypertension Sister     Breast Cancer Neg     Ovarian Cancer Neg        Social History     Occupational History    Not on file   Tobacco Use    Smoking status: Never     Passive exposure: Never    Smokeless tobacco: Never   Vaping Use    Vaping status: Never Used   Substance and Sexual Activity    Alcohol use: Never    Drug use:  Never    Sexual activity: Not on file        Review of Systems:  GENERAL: feels generally well, no significant weight loss or weight gain  SKIN: no ulcerated or worrisome skin lesions  EYES:denies blurred vision or double vision  HEENT: denies new nasal congestion, sinus pain or ST  LUNGS: denies shortness of breath  CARDIOVASCULAR: denies chest pain  GI: no hematemesis, no worsening heartburn, no diarrhea  : no dysuria, no blood in urine, no difficulty urinating, no incontinence  MUSCULOSKELETAL: no other musculoskeletal complaints other than in HPI  NEURO: no numbness or tingling, no weakness or balance disorder  PSYCHE: no depression or anxiety  HEMATOLOGIC: no hx of blood dyscrasia  ENDOCRINE: no thyroid or diabetes issues  ALL/ASTHMA: no new hx of severe allergy or asthma    Physical Examination:    There were no vitals taken for this visit.  Constitutional: appears well hydrated, alert and responsive, no acute distress noted  Extremities: She has a splint in place over the left wrist which were removed.  The skin is intact.  There is moderate swelling.  No numbness or tingling in the fingers.  Good capillary refill.      Imaging: XR WRIST (2 VIEWS), LEFT (CPT=73100)    Result Date: 9/18/2024  PROCEDURE: XR WRIST (2 VIEWS), LEFT (CPT=73100)  COMPARISON: [facility], XR WRIST COMPLETE (MIN 3 VIEWS), LEFT (CPT=73110), 9/18/2024, 5:11 PM.  INDICATIONS: post-reduction  TECHNIQUE: Three-view Impression:  Status post reduction  Radial fracture is now in near anatomic alignment.  There is no longer any angulation. Finalized by (CST): Forrest Lepe MD on 9/18/2024 at 8:40 PM          XR WRIST COMPLETE (MIN 3 VIEWS), LEFT (CPT=73110)    Result Date: 9/18/2024  PROCEDURE: XR WRIST COMPLETE (MIN 3 VIEWS), LEFT (CPT=73110)  COMPARISON: None.  INDICATIONS: Left medial and lateral wrist pain post fall today.  TECHNIQUE: 3 views were obtained.   FINDINGS:  BONES: Comminuted intra-articular mildly displaced  fracture of the distal radius with mild dorsal angulation.  Mildly displaced ulnar styloid process fracture.  Moderate to severe degenerative change 1st CMC joint.  Osteopenia. SOFT TISSUES: Negative. No visible soft tissue swelling. EFFUSION: None visible. OTHER: Negative.         CONCLUSION:   Comminuted intra-articular distal radial fracture with mild displacement and mild dorsal angulation.  Mildly displaced ulnar styloid process fracture.    Dictated by (CST): Alec Hahn MD on 9/18/2024 at 5:27 PM     Finalized by (CST): Alec Hahn MD on 9/18/2024 at 5:28 PM             Lab Results   Component Value Date    WBC 6.6 05/01/2024    HGB 13.3 05/01/2024     05/01/2024      Lab Results   Component Value Date     (H) 05/01/2024    BUN 11 05/01/2024    CREATSERUM 0.83 05/01/2024    GFRNAA 83 09/17/2021    GFRAA 97 09/17/2021        Assessment and Plan:  Diagnoses and all orders for this visit:    Closed Colles' fracture of left radius, initial encounter        Assessment: She has Colles fracture with intra-articular component of the left distal radius.  Overall alignment is satisfactory.  We talked about the options for nonoperative as well as surgical treatment.    Plan: She prefers a nonoperative approach and given her age and the fact that this is her nondominant extremity that is appropriate.  We applied a short arm cast.  Follow-up in 2 weeks with radiographs through the cast.  I told her that if significant fracture displacement reoccurs that we may want to reconsider her treatment options.    The above note was creating using Dragon speech recognition technology. Please excuse any typos.    TATO ALLEN MD

## 2024-10-28 ENCOUNTER — OFFICE VISIT (OUTPATIENT)
Dept: ORTHOPEDICS CLINIC | Facility: CLINIC | Age: 74
End: 2024-10-28
Payer: MEDICARE

## 2024-10-28 ENCOUNTER — HOSPITAL ENCOUNTER (OUTPATIENT)
Dept: GENERAL RADIOLOGY | Facility: HOSPITAL | Age: 74
Discharge: HOME | End: 2024-10-28
Attending: PHYSICIAN ASSISTANT
Payer: MEDICARE

## 2024-10-28 DIAGNOSIS — Z47.89 ORTHOPEDIC AFTERCARE: ICD-10-CM

## 2024-10-28 DIAGNOSIS — S52.532A CLOSED COLLES' FRACTURE OF LEFT RADIUS, INITIAL ENCOUNTER: ICD-10-CM

## 2024-10-28 DIAGNOSIS — S52.532A CLOSED COLLES' FRACTURE OF LEFT RADIUS, INITIAL ENCOUNTER: Primary | ICD-10-CM

## 2024-10-28 PROCEDURE — 73110 X-RAY EXAM OF WRIST: CPT | Performed by: PHYSICIAN ASSISTANT

## 2024-10-28 PROCEDURE — 1159F MED LIST DOCD IN RCRD: CPT | Performed by: PHYSICIAN ASSISTANT

## 2024-10-28 PROCEDURE — 1125F AMNT PAIN NOTED PAIN PRSNT: CPT | Performed by: PHYSICIAN ASSISTANT

## 2024-10-28 PROCEDURE — 1160F RVW MEDS BY RX/DR IN RCRD: CPT | Performed by: PHYSICIAN ASSISTANT

## 2024-10-28 PROCEDURE — 99024 POSTOP FOLLOW-UP VISIT: CPT | Performed by: PHYSICIAN ASSISTANT

## 2024-10-28 RX ORDER — HYDROCODONE BITARTRATE AND ACETAMINOPHEN 7.5; 325 MG/1; MG/1
1 TABLET ORAL EVERY 6 HOURS PRN
Qty: 30 TABLET | Refills: 0 | Status: SHIPPED | OUTPATIENT
Start: 2024-10-28

## 2024-10-28 NOTE — PROGRESS NOTES
NURSING INTAKE COMMENTS:   Chief Complaint   Patient presents with    Fracture     Patient is here to follow up on left wrist fracture. LOV was on 9/23/24. She rates pain 10/10, there is swelling, denies any tingling. Numbness in thumb and 5th digit.       HPI: This 73 year old female presents today for follow-up on her left wrist.  She is under our care for a left wrist fracture.  Is been almost 5 weeks since her injury.  She was post to follow-up with us a few weeks after her last appointment however there was an issue with scheduling.  She continues to have significant pain in the wrist.  She is using Tylenol for pain management.    Past Medical History:    Arthritis    Bell's palsy    Colon adenomas    #3    Goiter    High blood pressure    High cholesterol    Hyperlipidemia     Past Surgical History:   Procedure Laterality Date    Colonoscopy  12/2000    Mirna    Colonoscopy      [facility]    Colonoscopy N/A 10/13/2020    Procedure: COLONOSCOPY;  Surgeon: Jered Green MD;  Location: [facility] ENDO    Colonoscopy N/A 8/14/2023    Procedure: COLONOSCOPY;  Surgeon: Tavon Rice MD;  Location: [facility]     Current Outpatient Medications   Medication Sig Dispense Refill    losartan 50 MG Oral Tab Take 1 tablet (50 mg total) by mouth daily. 90 tablet 3    atorvastatin 20 MG Oral Tab Take 1 tablet (20 mg total) by mouth nightly. 90 tablet 3     Allergies[1]  Family History   Problem Relation Age of Onset    Heart Disorder Father     Renal Disease Mother     Hypertension Sister     Breast Cancer Neg     Ovarian Cancer Neg        Social History     Occupational History    Not on file   Tobacco Use    Smoking status: Never     Passive exposure: Never    Smokeless tobacco: Never   Vaping Use    Vaping status: Never Used   Substance and Sexual Activity    Alcohol use: Never    Drug use: Never    Sexual activity: Not on file        Review of Systems:  GENERAL: feels generally well, no significant weight loss or  weight gain  SKIN: no ulcerated or worrisome skin lesions  EYES:denies blurred vision or double vision  HEENT: denies new nasal congestion, sinus pain or ST  LUNGS: denies shortness of breath  CARDIOVASCULAR: denies chest pain  GI: no hematemesis, no worsening heartburn, no diarrhea  : no dysuria, no blood in urine, no difficulty urinating, no incontinence  MUSCULOSKELETAL: no other musculoskeletal complaints other than in HPI  NEURO: no numbness or tingling, no weakness or balance disorder  PSYCHE: no depression or anxiety  HEMATOLOGIC: no hx of blood dyscrasia  ENDOCRINE: no thyroid or diabetes issues  ALL/ASTHMA: no new hx of severe allergy or asthma    Physical Examination:    There were no vitals taken for this visit.  Constitutional: appears well hydrated, alert and responsive, no acute distress noted  Extremities: We remove the cast.  The skin is intact.  Tenderness to palpation.  Pain with motion of the wrist.    Imaging:  3 views of the wrist demonstrate a left distal radius fracture that has had some subsidence since her previous film.  Ulnar styloid fracture is also present.  Colles' fracture left wrist Ms. Mckeon has a left wrist fracture    Lab Results   Component Value Date    WBC 6.6 05/01/2024    HGB 13.3 05/01/2024     05/01/2024      Lab Results   Component Value Date     (H) 05/01/2024    BUN 11 05/01/2024    CREATSERUM 0.83 05/01/2024    GFRNAA 83 09/17/2021    GFRAA 97 09/17/2021        Assessment and Plan:  Diagnoses and all orders for this visit:    Closed Colles' fracture of left radius, initial encounter  -     XR WRIST COMPLETE (MIN 3 VIEWS), LEFT (CPT=73110); Future    Orthopedic aftercare        Assessmen Colles' fracture left wrist     Plan: Ms. Mckeon is recovering from a left wrist fracture.  We discussed her x-ray findings.  We discussed  continuing with conservative management in the cast versus surgical repair.  We feel it would be very difficult to proceed with  surgery given the fact that there is probably already a significant amount of healing present.  There is also her nondominant hand.  She did notice some improvement in her symptoms following cast removal.  We are going to continue with conservative management for now.  Will place her in another short arm cast and see her back in 2 weeks with cast removal and new x-rays.  We did discuss that in the future if there were issues with healing and function an osteotomy would become an option.  I advised her to call if any questions or problems arise prior to her next appointment.    The above note was creating using Dragon speech recognition technology. Please excuse any typos.    This visit was performed under the supervision of Dr. Edil Ramos who formulated the treatment plan and decision making.        [1] No Known Allergies

## 2024-11-11 ENCOUNTER — OFFICE VISIT (OUTPATIENT)
Dept: ORTHOPEDICS CLINIC | Facility: CLINIC | Age: 74
End: 2024-11-11
Payer: MEDICARE

## 2024-11-11 ENCOUNTER — HOSPITAL ENCOUNTER (OUTPATIENT)
Dept: GENERAL RADIOLOGY | Facility: HOSPITAL | Age: 74
Discharge: HOME | End: 2024-11-11
Attending: ORTHOPAEDIC SURGERY
Payer: MEDICARE

## 2024-11-11 VITALS — WEIGHT: 169 LBS | BODY MASS INDEX: 27.16 KG/M2 | HEIGHT: 66 IN

## 2024-11-11 DIAGNOSIS — S62.102B OPEN FRACTURE OF LEFT WRIST, INITIAL ENCOUNTER: ICD-10-CM

## 2024-11-11 DIAGNOSIS — Z47.89 ORTHOPEDIC AFTERCARE: ICD-10-CM

## 2024-11-11 DIAGNOSIS — S62.102A CLOSED FRACTURE OF LEFT WRIST, INITIAL ENCOUNTER: ICD-10-CM

## 2024-11-11 DIAGNOSIS — S62.102A CLOSED FRACTURE OF LEFT WRIST, INITIAL ENCOUNTER: Primary | ICD-10-CM

## 2024-11-11 PROCEDURE — 73110 X-RAY EXAM OF WRIST: CPT | Performed by: ORTHOPAEDIC SURGERY

## 2024-11-11 RX ORDER — TRAMADOL HYDROCHLORIDE 50 MG/1
50 TABLET ORAL EVERY 6 HOURS PRN
Qty: 30 TABLET | Refills: 0 | Status: SHIPPED | OUTPATIENT
Start: 2024-11-11

## 2024-11-11 NOTE — PROGRESS NOTES
NURSING INTAKE COMMENTS:   Chief Complaint   Patient presents with    Follow - Up     Closed Colles' fracture of left radius reevaluation.  Pain 9/10       HPI: This 73 year old female presents today for follow-up of her left wrist callus fracture.  She is still having moderate pain.    Past Medical History:    Arthritis    Bell's palsy    Colon adenomas    #3    Goiter    High blood pressure    High cholesterol    Hyperlipidemia     Past Surgical History:   Procedure Laterality Date    Colonoscopy  12/2000    Mirna    Colonoscopy      [facility]    Colonoscopy N/A 10/13/2020    Procedure: COLONOSCOPY;  Surgeon: Jered Green MD;  Location: [facility]    Colonoscopy N/A 8/14/2023    Procedure: COLONOSCOPY;  Surgeon: Tavon Rice MD;  Location: [facility]     Current Outpatient Medications   Medication Sig Dispense Refill    HYDROcodone-acetaminophen 7.5-325 MG Oral Tab Take 1 tablet by mouth every 6 (six) hours as needed for Pain. No alcohol or driving on this med. Stop if lethargic or hallucinating. 30 tablet 0    losartan 50 MG Oral Tab Take 1 tablet (50 mg total) by mouth daily. 90 tablet 3    atorvastatin 20 MG Oral Tab Take 1 tablet (20 mg total) by mouth nightly. 90 tablet 3     Allergies[1]  Family History   Problem Relation Age of Onset    Heart Disorder Father     Renal Disease Mother     Hypertension Sister     Breast Cancer Neg     Ovarian Cancer Neg        Social History     Occupational History    Not on file   Tobacco Use    Smoking status: Never     Passive exposure: Never    Smokeless tobacco: Never   Vaping Use    Vaping status: Never Used   Substance and Sexual Activity    Alcohol use: Never    Drug use: Never    Sexual activity: Not on file        Review of Systems:  GENERAL: feels generally well, no significant weight loss or weight gain  SKIN: no ulcerated or worrisome skin lesions  EYES:denies blurred vision or double vision  HEENT: denies new nasal congestion, sinus pain or  ST  LUNGS: denies shortness of breath  CARDIOVASCULAR: denies chest pain  GI: no hematemesis, no worsening heartburn, no diarrhea  : no dysuria, no blood in urine, no difficulty urinating, no incontinence  MUSCULOSKELETAL: no other musculoskeletal complaints other than in HPI  NEURO: no numbness or tingling, no weakness or balance disorder  PSYCHE: no depression or anxiety  HEMATOLOGIC: no hx of blood dyscrasia  ENDOCRINE: no thyroid or diabetes issues  ALL/ASTHMA: no new hx of severe allergy or asthma    Physical Examination:    Ht 5' 6\" (1.676 m)   Wt 169 lb (76.7 kg)   BMI 27.28 kg/m²   Constitutional: appears well hydrated, alert and responsive, no acute distress noted  Extremities: Minimal tenderness to palpation over the fracture site per se.  Mild to moderate swelling.      Imaging: XR WRIST COMPLETE (MIN 3 VIEWS), LEFT (CPT=73110)    Result Date: 10/28/2024  PROCEDURE: XR WRIST COMPLETE (MIN 3 VIEWS), LEFT (CPT=73110)  COMPARISON: [facility], XR WRIST (2 VIEWS), LEFT (CPT=73100), 9/18/2024, 8:19 PM.  INDICATIONS: follow up left wrist FX  TECHNIQUE: 3 views were obtained.   FINDINGS: See findings in CONCLUSION         CONCLUSION:  1. Healing impacted distal radial fracture with dorsal angulation distal component. 2. Minimally displaced fracture through the base the ulna styloid. 3. Osteoarthritic changes involving the triscaphe joint 1st carpal-metacarpal joint. 4. Overlying cast obscures subtle bony detail.    Dictated by (CST): Tushar Martinez MD on 10/28/2024 at 5:05 PM     Finalized by (CST): Tushar Martinez MD on 10/28/2024 at 5:06 PM             Lab Results   Component Value Date    WBC 6.6 05/01/2024    HGB 13.3 05/01/2024     05/01/2024      Lab Results   Component Value Date     (H) 05/01/2024    BUN 11 05/01/2024    CREATSERUM 0.83 05/01/2024    GFRNAA 83 09/17/2021    GFRAA 97 09/17/2021        Assessment and Plan:  Diagnoses and all orders for this  visit:    Closed fracture of left wrist, initial encounter  -     XR WRIST COMPLETE (MIN 3 VIEWS), LEFT (CPT=73110); Future    Open fracture of left wrist, initial encounter  -     Cancel: X-RAY WRIST 3+ VW; Future    Orthopedic aftercare  -     XR WRIST COMPLETE (MIN 3 VIEWS), LEFT (CPT=73110); Future  -     DME - EXTERNAL         Assessment: Progressive healing of impacted left distal radius fracture.    Plan: Continue with removable splint.  Follow-up in 4 weeks with new radiographs.  If there is evidence of progressive healing we will start physical therapy at that time.    Addendum: She was unable to tolerate to the removable wrist splint so we recasted her.  Will follow-up in 3 weeks for cast removal and new x-rays    The above note was creating using Dragon speech recognition technology. Please excuse any typos.    TATO ALLEN MD         [1] No Known Allergies

## 2024-11-11 NOTE — PROGRESS NOTES
Per verbal order from Dr. Ramos remove patients cast. Cast was removed without incident and patient tolerated the procedure well. Haven CALDERON MA

## 2024-11-23 ENCOUNTER — PATIENT MESSAGE (OUTPATIENT)
Dept: ORTHOPEDICS CLINIC | Facility: CLINIC | Age: 74
End: 2024-11-23

## 2024-11-25 ENCOUNTER — TELEPHONE (OUTPATIENT)
Dept: INTERNAL MEDICINE CLINIC | Facility: CLINIC | Age: 74
End: 2024-11-25

## 2024-11-25 ENCOUNTER — OFFICE VISIT (OUTPATIENT)
Dept: ORTHOPEDICS CLINIC | Facility: CLINIC | Age: 74
End: 2024-11-25
Payer: MEDICARE

## 2024-11-25 ENCOUNTER — TELEPHONE (OUTPATIENT)
Dept: ORTHOPEDICS CLINIC | Facility: CLINIC | Age: 74
End: 2024-11-25

## 2024-11-25 ENCOUNTER — HOSPITAL ENCOUNTER (OUTPATIENT)
Dept: GENERAL RADIOLOGY | Facility: HOSPITAL | Age: 74
Discharge: HOME | End: 2024-11-25
Attending: PHYSICIAN ASSISTANT
Payer: MEDICARE

## 2024-11-25 DIAGNOSIS — S62.102B OPEN FRACTURE OF LEFT WRIST, INITIAL ENCOUNTER: Primary | ICD-10-CM

## 2024-11-25 DIAGNOSIS — Z47.89 ORTHOPEDIC AFTERCARE: ICD-10-CM

## 2024-11-25 DIAGNOSIS — S62.102D CLOSED FRACTURE OF LEFT WRIST WITH ROUTINE HEALING, SUBSEQUENT ENCOUNTER: Primary | ICD-10-CM

## 2024-11-25 PROCEDURE — 73110 X-RAY EXAM OF WRIST: CPT | Performed by: PHYSICIAN ASSISTANT

## 2024-11-25 NOTE — TELEPHONE ENCOUNTER
Patient broke right wrist and went to Orthopedic. Orthopedic needs referral from Dr. Downs for insurance purposes.     Please fax referral to Orthopedic office [phone]

## 2024-11-25 NOTE — PROGRESS NOTES
NURSING INTAKE COMMENTS:   Chief Complaint   Patient presents with    Fracture     L wrist f/u- per pt early visit due to pain in the wrist - feels cast is too tight- rates pain 4/10 on and off       HPI: This 73 year old female presents today for follow-up on her left wrist.  She is under our care for a left wrist fracture.  She has been in a cast since her last visit a week and a half ago.  She came in today due to the cast being uncomfortable.  We tried placing her in a removable wrist splint at her last visit but she could not tolerate it.    Past Medical History:    Arthritis    Bell's palsy    Colon adenomas    #3    Goiter    High blood pressure    High cholesterol    Hyperlipidemia     Past Surgical History:   Procedure Laterality Date    Colonoscopy  12/2000    Mirna    Colonoscopy      [facility]    Colonoscopy N/A 10/13/2020    Procedure: COLONOSCOPY;  Surgeon: Jered Green MD;  Location: [facility]    Colonoscopy N/A 8/14/2023    Procedure: COLONOSCOPY;  Surgeon: Tavon Rice MD;  Location: [facility]     Current Outpatient Medications   Medication Sig Dispense Refill    traMADol 50 MG Oral Tab Take 1 tablet (50 mg total) by mouth every 6 (six) hours as needed for Pain. No alcohol or driving on this med. Stop if lethargic or hallucinating. 30 tablet 0    HYDROcodone-acetaminophen 7.5-325 MG Oral Tab Take 1 tablet by mouth every 6 (six) hours as needed for Pain. No alcohol or driving on this med. Stop if lethargic or hallucinating. (Patient not taking: Reported on 11/25/2024) 30 tablet 0    losartan 50 MG Oral Tab Take 1 tablet (50 mg total) by mouth daily. 90 tablet 3    atorvastatin 20 MG Oral Tab Take 1 tablet (20 mg total) by mouth nightly. 90 tablet 3     Allergies[1]  Family History   Problem Relation Age of Onset    Heart Disorder Father     Renal Disease Mother     Hypertension Sister     Breast Cancer Neg     Ovarian Cancer Neg        Social History     Occupational History    Not on  file   Tobacco Use    Smoking status: Never     Passive exposure: Never    Smokeless tobacco: Never   Vaping Use    Vaping status: Never Used   Substance and Sexual Activity    Alcohol use: Never    Drug use: Never    Sexual activity: Not on file        Review of Systems:  GENERAL: feels generally well, no significant weight loss or weight gain  SKIN: no ulcerated or worrisome skin lesions  EYES:denies blurred vision or double vision  HEENT: denies new nasal congestion, sinus pain or ST  LUNGS: denies shortness of breath  CARDIOVASCULAR: denies chest pain  GI: no hematemesis, no worsening heartburn, no diarrhea  : no dysuria, no blood in urine, no difficulty urinating, no incontinence  MUSCULOSKELETAL: no other musculoskeletal complaints other than in HPI  NEURO: no numbness or tingling, no weakness or balance disorder  PSYCHE: no depression or anxiety  HEMATOLOGIC: no hx of blood dyscrasia  ENDOCRINE: no thyroid or diabetes issues  ALL/ASTHMA: no new hx of severe allergy or asthma    Physical Examination:    There were no vitals taken for this visit.  Constitutional: appears well hydrated, alert and responsive, no acute distress noted  Extremities: The cast was removed.  The skin is intact.  Tenderness to palpation over the fracture site.  Decreased range of motion of the thumb and fingers.      Imaging: XR WRIST COMPLETE (MIN 3 VIEWS), LEFT (CPT=73110)    Result Date: 11/11/2024  PROCEDURE: XR WRIST COMPLETE (MIN 3 VIEWS), LEFT (CPT=73110)  COMPARISON: [facility], XR WRIST (2 VIEWS), LEFT (CPT=73100), 9/18/2024, 8:19 PM.  [facility] 2nd Floor, XR WRIST COMPLETE (MIN 3 VIEWS), LEFT (CPT=73110), 10/28/2024, 11:50 AM.  INDICATIONS: follow up left wrist FX  TECHNIQUE: 3 views were obtained.   FINDINGS:  Interval removal of external cast. BONES: Demineralized osseous structures.  Redemonstration of a transversely oriented fracture with mild impaction involving the distal  metadiaphysis of the left radius.  Minimal callus formation along the dorsal margin of fracture site.  Nonunion transverse fracture of the base of the ulnar styloid process.  Intercarpal and carpometacarpal joint space narrowing is present.  No suspicious bone lesion. SOFT TISSUES: Mild soft tissue inflammation around the wrist. EFFUSION: None visible. OTHER: Negative.         CONCLUSION:  1.  Demineralized osseous structures possibly from disuse.  This has progressed since previous exam. 2.  Impacted transverse fracture in the distal left radius, with minimal callus formation.  Additional follow-up is recommended to assess bone healing. 2.  Nonunion transverse fracture at the base of the ulnar styloid process.   Dictated by (CST): Felipe Rojas MD on 11/11/2024 at 3:00 PM     Finalized by (CST): Felipe Rojas MD on 11/11/2024 at 3:04 PM          XR WRIST COMPLETE (MIN 3 VIEWS), LEFT (CPT=73110)    Result Date: 10/28/2024  PROCEDURE: XR WRIST COMPLETE (MIN 3 VIEWS), LEFT (CPT=73110)  COMPARISON: [facility], XR WRIST (2 VIEWS), LEFT (CPT=73100), 9/18/2024, 8:19 PM.  INDICATIONS: follow up left wrist FX  TECHNIQUE: 3 views were obtained.   FINDINGS: See findings in CONCLUSION         CONCLUSION:  1. Healing impacted distal radial fracture with dorsal angulation distal component. 2. Minimally displaced fracture through the base the ulna styloid. 3. Osteoarthritic changes involving the triscaphe joint 1st carpal-metacarpal joint. 4. Overlying cast obscures subtle bony detail.    Dictated by (CST): Tushar Martinez MD on 10/28/2024 at 5:05 PM     Finalized by (CST): Tushar Martinez MD on 10/28/2024 at 5:06 PM             Lab Results   Component Value Date    WBC 6.6 05/01/2024    HGB 13.3 05/01/2024     05/01/2024      Lab Results   Component Value Date     (H) 05/01/2024    BUN 11 05/01/2024    CREATSERUM 0.83 05/01/2024    GFRNAA 83 09/17/2021    GFRAA 97 09/17/2021        Assessment  and Plan:  Diagnoses and all orders for this visit:    Open fracture of left wrist, initial encounter    Orthopedic aftercare  -     XR WRIST COMPLETE (MIN 3 VIEWS), LEFT (CPT=73110); Future      Plan: We discussed options for treatment.  We placed her in a removable wrist splint which she will wear for the next 2 weeks.  Will see her back with new x-rays of the wrist at that time.  If there is been sufficient healing, I anticipate starting therapy.  Advised her to call if any questions or problems arise in the meantime    The above note was creating using Dragon speech recognition technology. Please excuse any typos.    This visit was performed under the supervision of Dr. Edil Ramos who formulated the treatment plan and decision making.        [1] No Known Allergies

## 2024-11-25 NOTE — TELEPHONE ENCOUNTER
Per patient needs to discuss getting cast removed, cast is causing pain. Asking if she can be seen today. Please call thank you.

## 2024-12-11 ENCOUNTER — OFFICE VISIT (OUTPATIENT)
Dept: ORTHOPEDICS CLINIC | Facility: CLINIC | Age: 74
End: 2024-12-11
Payer: MEDICARE

## 2024-12-11 ENCOUNTER — HOSPITAL ENCOUNTER (OUTPATIENT)
Dept: GENERAL RADIOLOGY | Facility: HOSPITAL | Age: 74
Discharge: HOME | End: 2024-12-11
Attending: ORTHOPAEDIC SURGERY
Payer: MEDICARE

## 2024-12-11 DIAGNOSIS — Z47.89 ORTHOPEDIC AFTERCARE: Primary | ICD-10-CM

## 2024-12-11 DIAGNOSIS — S62.102B OPEN FRACTURE OF LEFT WRIST, INITIAL ENCOUNTER: ICD-10-CM

## 2024-12-11 PROCEDURE — 73110 X-RAY EXAM OF WRIST: CPT | Performed by: ORTHOPAEDIC SURGERY

## 2024-12-11 PROCEDURE — 1125F AMNT PAIN NOTED PAIN PRSNT: CPT | Performed by: ORTHOPAEDIC SURGERY

## 2024-12-11 PROCEDURE — 1159F MED LIST DOCD IN RCRD: CPT | Performed by: ORTHOPAEDIC SURGERY

## 2024-12-11 PROCEDURE — 99024 POSTOP FOLLOW-UP VISIT: CPT | Performed by: ORTHOPAEDIC SURGERY

## 2024-12-11 PROCEDURE — 1160F RVW MEDS BY RX/DR IN RCRD: CPT | Performed by: ORTHOPAEDIC SURGERY

## 2024-12-11 NOTE — PROGRESS NOTES
NURSING INTAKE COMMENTS:   Chief Complaint   Patient presents with    Fracture     Left wrist fracture, pain 5/10 today, doing good today.       HPI: This 73 year old female presents today for follow-up of a left distal radius fracture.  She reports that she feels much better.  She is having less pain.  She has been wearing her removable splint full-time.  Past Medical History:    Arthritis    Bell's palsy    Colon adenomas    #3    Goiter    High blood pressure    High cholesterol    Hyperlipidemia     Past Surgical History:   Procedure Laterality Date    Colonoscopy  12/2000    Mirna    Colonoscopy      [facility]    Colonoscopy N/A 10/13/2020    Procedure: COLONOSCOPY;  Surgeon: Jered Green MD;  Location: [facility]    Colonoscopy N/A 8/14/2023    Procedure: COLONOSCOPY;  Surgeon: Tavon Rice MD;  Location: [facility]     Current Outpatient Medications   Medication Sig Dispense Refill    losartan 50 MG Oral Tab Take 1 tablet (50 mg total) by mouth daily. 90 tablet 3    atorvastatin 20 MG Oral Tab Take 1 tablet (20 mg total) by mouth nightly. 90 tablet 3     Allergies[1]  Family History   Problem Relation Age of Onset    Heart Disorder Father     Renal Disease Mother     Hypertension Sister     Breast Cancer Neg     Ovarian Cancer Neg        Social History     Occupational History    Not on file   Tobacco Use    Smoking status: Never     Passive exposure: Never    Smokeless tobacco: Never   Vaping Use    Vaping status: Never Used   Substance and Sexual Activity    Alcohol use: Never    Drug use: Never    Sexual activity: Not on file        Review of Systems:  GENERAL: feels generally well, no significant weight loss or weight gain  SKIN: no ulcerated or worrisome skin lesions  EYES:denies blurred vision or double vision  HEENT: denies new nasal congestion, sinus pain or ST  LUNGS: denies shortness of breath  CARDIOVASCULAR: denies chest pain  GI: no hematemesis, no worsening heartburn, no  diarrhea  : no dysuria, no blood in urine, no difficulty urinating, no incontinence  MUSCULOSKELETAL: no other musculoskeletal complaints other than in HPI  NEURO: no numbness or tingling, no weakness or balance disorder  PSYCHE: no depression or anxiety  HEMATOLOGIC: no hx of blood dyscrasia  ENDOCRINE: no thyroid or diabetes issues  ALL/ASTHMA: no new hx of severe allergy or asthma    Physical Examination:    There were no vitals taken for this visit.  Constitutional: appears well hydrated, alert and responsive, no acute distress noted  Extremities: Much less swelling.  Minimal tenderness to palpation over the distal radius.  No significant deformity.      Imaging: No change in position alignment of the impacted distal radius fracture with some progressive healing.  Disuse osteopenia noted.    XR WRIST COMPLETE (MIN 3 VIEWS), LEFT (CPT=73110)    Result Date: 11/26/2024  PROCEDURE: XR WRIST COMPLETE (MIN 3 VIEWS), LEFT (CPT=73110)  COMPARISON: [facility] 2nd Floor, XR WRIST COMPLETE (MIN 3 VIEWS), LEFT (CPT=73110), 11/11/2024, 2:42 PM.  [address], XR WRIST COMPLETE (MIN 3 VIEWS), LEFT (CPT=73110), 10/28/2024, 11:50 AM.  [facility], XR WRIST (2 VIEWS), LEFT (CPT=73100), 9/18/2024, 8:19 PM.  [facility], XR WRIST COMPLETE (MIN 3 VIEWS), LEFT (CPT=73110), 9/18/2024, 5:11 PM.  INDICATIONS: Left wrist fracture; follow up.  TECHNIQUE: 3 views were obtained.   FINDINGS:  BONES: A comminuted impacted fracture of the distal radial metadiaphysis is apparent. A displaced ulnar styloid process fracture is evident. There is evidence of significant degenerative arthropathy at the 1st carpometacarpal joint. The intercarpal distances are narrowed. The carpal arcs are well aligned. A background of advanced presumed disuse osteopenia is demonstrated. SOFT TISSUES: Circumferential soft tissue swelling is apparent. Negative for discernible  radiopaque foreign body. EFFUSION: None visible.           CONCLUSION:  1. Impacted transversely oriented fracture of the distal left radial metadiaphysis.  2. Advanced primary osteoarthritic changes of the left wrist.  3. Substantial progressively worsening disuse osteopenia.    Dictated by (CST): Mark Wade MD on 11/26/2024 at 9:41 AM     Finalized by (CST): Mark Wade MD on 11/26/2024 at 9:44 AM          XR WRIST COMPLETE (MIN 3 VIEWS), LEFT (CPT=73110)    Result Date: 11/11/2024  PROCEDURE: XR WRIST COMPLETE (MIN 3 VIEWS), LEFT (CPT=73110)  COMPARISON: [facility], XR WRIST (2 VIEWS), LEFT (CPT=73100), 9/18/2024, 8:19 PM.  [facility] 2nd Floor, XR WRIST COMPLETE (MIN 3 VIEWS), LEFT (CPT=73110), 10/28/2024, 11:50 AM.  INDICATIONS: follow up left wrist FX  TECHNIQUE: 3 views were obtained.   FINDINGS:  Interval removal of external cast. BONES: Demineralized osseous structures.  Redemonstration of a transversely oriented fracture with mild impaction involving the distal metadiaphysis of the left radius.  Minimal callus formation along the dorsal margin of fracture site.  Nonunion transverse fracture of the base of the ulnar styloid process.  Intercarpal and carpometacarpal joint space narrowing is present.  No suspicious bone lesion. SOFT TISSUES: Mild soft tissue inflammation around the wrist. EFFUSION: None visible. OTHER: Negative.         CONCLUSION:  1.  Demineralized osseous structures possibly from disuse.  This has progressed since previous exam. 2.  Impacted transverse fracture in the distal left radius, with minimal callus formation.  Additional follow-up is recommended to assess bone healing. 2.  Nonunion transverse fracture at the base of the ulnar styloid process.   Dictated by (CST): Felipe Rojas MD on 11/11/2024 at 3:00 PM     Finalized by (CST): Felipe Rojas MD on 11/11/2024 at 3:04 PM             Lab Results   Component Value Date    WBC 6.6  05/01/2024    HGB 13.3 05/01/2024     05/01/2024      Lab Results   Component Value Date     (H) 05/01/2024    BUN 11 05/01/2024    CREATSERUM 0.83 05/01/2024    GFRNAA 83 09/17/2021    GFRAA 97 09/17/2021        Assessment and Plan:  Diagnoses and all orders for this visit:    Orthopedic aftercare  -     OCCUPATIONAL THERAPY - INTERNAL    Other orders  -     XR WRIST COMPLETE (MIN 3 VIEWS), LEFT (CPT=73110); Future        Assessment: Healing left distal radius fracture.    Plan: I would like her to start weaning out of the splint.  I prescribed occupational therapy.  Follow-up in 4 weeks with new radiographs.    The above note was creating using Dragon speech recognition technology. Please excuse any typos.    TATO ALLEN MD       [1] No Known Allergies

## 2024-12-13 ENCOUNTER — TELEPHONE (OUTPATIENT)
Dept: PHYSICAL THERAPY | Facility: HOSPITAL | Age: 74
End: 2024-12-13

## 2024-12-17 ENCOUNTER — TELEPHONE (OUTPATIENT)
Dept: PHYSICAL THERAPY | Facility: HOSPITAL | Age: 74
End: 2024-12-17

## 2024-12-19 ENCOUNTER — OFFICE VISIT (OUTPATIENT)
Dept: OCCUPATIONAL MEDICINE | Facility: HOSPITAL | Age: 74
End: 2024-12-19
Attending: ORTHOPAEDIC SURGERY
Payer: MEDICARE

## 2024-12-19 DIAGNOSIS — Z47.89 ORTHOPEDIC AFTERCARE: Primary | ICD-10-CM

## 2024-12-19 PROCEDURE — 97166 OT EVAL MOD COMPLEX 45 MIN: CPT

## 2024-12-19 PROCEDURE — 97110 THERAPEUTIC EXERCISES: CPT

## 2024-12-19 NOTE — PROGRESS NOTES
OCCUPATIONAL THERAPY UPPER EXTREMITY EVALUATION     Diagnosis:   Post left distal radius fracture, Orthopedic aftercare (Z47.89)       Referring Provider: Edil Ramos  Date of Evaluation:    12/19/2024    Precautions:   arthritis,HTN Next MD visit: 01/08/25  Date of Surgery: n/a  Date of Injury: 09/18/24     PATIENT SUMMARY   Ela Mckeon is a 73 year old female who presents to therapy today with complaints of left wrist pain and stiffness 2/2 distal radius and ulnar styloid fracture. Pt sustained fractures after FOOSH on 9/18/24. Pt saw an orthopedic specialist on 9/23/24 and decided on non-operative healing. Pt was then casted for healing. On 11/25/24, pt transitioned to removable prefabricated wrist cock-up orthosis.  Pt describes pain level current 3-4/10, at best 3/10, at worst 6/10.   Current functional limitations include difficulty with dressing, showering, compensating with Right hand, and not currently driving.    Ela describes prior level of function independent in adls and iadls, driving.   Employment: Retired  Hand Dominance: right  Living Situation:   and dtr  Imaging/Tests: Most recent x-ray 12/11/24 of Left wrist  CONCLUSION: Healing distal radial fracture with impaction and dorsal angulation. Nondisplaced ulnar styloid fracture. Severe osteopenia. Severe osteoarthritis of the thumb carpometacarpal joint.      Dictated by (CST): Gerardo Shelton MD on 12/12/2024 at 10:21 AM       Finalized by (CST): Gerardo Shelton MD on 12/12/2024 at 10:21 AM   Pt goals include return to PLOF, be able to use both hands.  Past medical history was reviewed with Ela. Significant findings include   Past Medical History:    Arthritis    Bell's palsy    Colon adenomas    #3    Goiter    High blood pressure    High cholesterol    Hyperlipidemia       ASSESSMENT  Ela presents to occupational therapy evaluation with primary c/o Left hand/wrist pain and stiffness 2/2 distal radius and ulnar styloid  fracture. The results of the objective tests and measures show deficits in STR extrinsic and intrinsic hand muscle resulting in AROM deficits.  Functional deficits include but are not limited to difficulty with dressing, showering, compensating with Right hand, and not currently driving.  Signs and symptoms are consistent with diagnosis of Left distal radius fracture resulting in orthopedic aftercare. Pt and OT discussed evaluation findings, pathology, POC and HEP.  Pt voiced understanding and performs HEP correctly without reported pain. Skilled Occupational Therapy is medically necessary to address the above impairments and reach functional goals.     OBJECTIVE    OBSERVATION: Unremarkable     ORTHOTICS: prefabricated wrist cock-up orthosis    SCAR:  none      SENSORY: WNL      ROM: (* denotes performed with pain)  Shoulder  Elbow Wrist   Flexion: R 135; L 95  Abduction: R 150; L 120   Supination: R 75, L 45  Pronation: R 80, L 50 Flexion: R 65, L 20  Extension: R 55, L 15  Ulnar Deviation: R 25, L 0  Radial Deviation R 5, L 0     AROM/PROM:(Degrees)  LEFT HAND:    Thumb IF MF RF SF   MP 0/20 0/65 0/75 0/60 0/45   PIP  0/0 0/0 0/0 0/0   DIP 0/10 0/0 0/0 0/0 0/0   JAMES 30 65 75 60 45       Strength (lbs) Right Average Left Average   : Not tested (NT) NT   2 pt Pinch: NT NT   3 pt Pinch: NT NT   Lateral Pinch: NT NT     NECK SCREEN: WNL    Today’s Treatment and Response:   Pt education was provided on exam findings, treatment diagnosis, treatment plan, expectations, and prognosis. Patient was instructed in and issued a HEP for: tendon glides, AROM: wrist flex/extend, RD/UD, forearm sup/pronation, shoulder flexion towel slides    Charges: OT Eval: Moderate Complexity, TE 2      Total Timed Treatment: 25 min     Total Treatment Time: 45 min     Based on clinical rationale and outcome measures, this evaluation involved Low Complexity decision making due to 3+ personal factors/comorbidities, 3 body structures  involved/activity limitations, and evolving symptoms including changing pain levels and decline in functional mobility .  PLAN OF CARE   Goals: (to be met in 8 visits)  Pt will be independent and compliant with comprehensive HEP to maintain progress achieved in OT  Pt will increase their (L) wrist flexion to at least 50 degrees for ease of dressing  Pt will increase their (L) wrist extension  to at least 50  degrees for ease bathing  Pt will increase their (L) forearm supination and pronation  to at least 60 degrees for opening cabinets/drawers.  Pt will increase their (L) thumb JAMES by 15-20 degrees for ease of opening doors.  Pt will increase their (L) IF JAMES by 15-20 degrees for ease of manipulating buttons.  Pt will increase their (L) MF JAMES by 15-20 degrees for ease of holding steering wheel.  Pt will increase their (L) RF JAMES by 15-20 degrees for ease of holding cup.  Pt will increase their (L) SF JAMES by 15-20 degrees for ease of cupping medications.      Frequency / Duration: Patient will be seen for  2x/week or a total of 12 visits over a 90 day period.  Treatment will include: Manual Therapy, Soft tissue mobilization, AROM, PROM, Strengthening, Therapeutic Activity, Moist heat, cryotherapy, Ultrasound, Whirlpool (fluidotherapy), Paraffin, Electrical Stim, Orthosis,  Neuromuscular Re-education, Patient education, Home exercise program,        Education or treatment limitation: None  Rehab Potential:good    QuickDASH Outcome Score  Score: (Patient-Rptd) 84.09 % (12/17/2024  1:38 PM)      Patient/Family/Caregiver was advised of these findings, precautions, and treatment options and has agreed to actively participate in planning and for this course of care.    Thank you for your referral. Please co-sign or sign and return this letter via fax as soon as possible to [phone]. If you have any questions, please contact me at Dept: [phone]    Sincerely,  Electronically signed by therapist: Carisa Stone  OT  Physician's certification required: Yes  I certify the need for these services furnished under this plan of treatment and while under my care.    X___________________________________________________ Date____________________    Certification From: 12/19/2024  To:3/19/2025

## 2024-12-19 NOTE — PATIENT INSTRUCTIONS
Access Code: [email or web address]  URL: [email or web address]  Date: 12/19/2024  Prepared by: Carisa MADISON MS, OTR/L    Exercises  - Seated Shoulder Flexion Towel Slide at Table Top  - 3 x daily - 7 x weekly - 2 sets - 10 reps - 3 hold  - Wrist Tendon Gliding  - 3 x daily - 7 x weekly - 1 sets - 10 reps - 3-5 hold  - Wrist Flexion Extension AROM  - 3 x daily - 7 x weekly - 2 sets - 10 reps - 3-5 hold  - Seated Wrist Radial and Ulnar Deviation AROM  - 3 x daily - 7 x weekly - 1 sets - 10 reps - 3-5 hold  - Seated Forearm Pronation and Supination AROM  - 3 x daily - 7 x weekly - 2 sets - 10 reps - 3-5 hold

## 2024-12-31 ENCOUNTER — OFFICE VISIT (OUTPATIENT)
Dept: OCCUPATIONAL MEDICINE | Facility: HOSPITAL | Age: 74
End: 2024-12-31
Attending: ORTHOPAEDIC SURGERY
Payer: MEDICARE

## 2024-12-31 PROCEDURE — 97110 THERAPEUTIC EXERCISES: CPT

## 2024-12-31 PROCEDURE — 97530 THERAPEUTIC ACTIVITIES: CPT

## 2024-12-31 RX ORDER — LOSARTAN POTASSIUM 50 MG/1
50 TABLET ORAL DAILY
Qty: 30 TABLET | Refills: 0 | OUTPATIENT
Start: 2024-12-31

## 2024-12-31 RX ORDER — LOSARTAN POTASSIUM 50 MG/1
50 TABLET ORAL DAILY
Qty: 90 TABLET | Refills: 3 | Status: SHIPPED | OUTPATIENT
Start: 2024-12-31

## 2024-12-31 NOTE — TELEPHONE ENCOUNTER
Refill passed per [facility] protocol.    Last office visit 9/20/2024    Last RX was for # 30     Medication pended for your review / approval      Medication List  As of 9/20/2024 10:04 AM    Atorvastatin Calcium 20 mg Oral Nightly    HYDROcodone-Acetaminophen 7.5-325 MG 1 tablet Oral 2 times daily PRN    Losartan Potassium 50 mg Oral Daily    Naproxen Sodium Oral    Requested Prescriptions   Pending Prescriptions Disp Refills    LOSARTAN 50 MG Oral Tab [Pharmacy Med Name: LOSARTAN 50MG TABLETS] 30 tablet 0     Sig: TAKE 1 TABLET(50 MG) BY MOUTH DAILY       Hypertension Medications Protocol Passed - 12/31/2024 10:07 AM        Passed - CMP or BMP in past 12 months        Passed - Last BP reading less than 140/90     BP Readings from Last 1 Encounters:   09/20/24 124/74               Passed - In person appointment or virtual visit in the past 12 mos or appointment in next 3 mos     Recent Outpatient Visits              1 week ago Orthopedic aftercare    [facility] Carisa Stone, GERALDINE    Office Visit    2 weeks ago [facility]    Office Visit    1 month ago Open fracture of left wrist, initial encounter    [facility]    Office Visit    1 month ago Closed fracture of left wrist, initial encounter    [facility]    Office Visit    2 months ago Closed Colles' fracture of left radius, initial encounter    [facility] [place]    Office Visit          Future Appointments         Provider Department Appt Notes    Today Carisa Stone, OT [facility] Occup[facility] Therapy 8 visits 12/19 to 3/19  [employer] MCR  $20 c/p    In 1 week Edil Ramos MD [place]  [facility] 4 Wk Follow Up- REF 2-3    In 1 week WiCarisa de, [facility] 8 visits 12/19 to 3/19  [employer] MCR  $20 c/p    In 2 weeks WiCarisa de, [facility] 8 visits 12/19 to 3/19  [employer] MCR  $20 c/p    [facility] 8 visits 12/19 to 3/19  [employer] MCR  $20 c/p    [facility] 8 visits 12/19 to 3/19  [employer] MCR  $20 c/p    [facility] 8 visits 12/19 to 3/19  [employer] MCR  $20 c/p    [facility] 8 visits 12/19 to 3/19  [employer] MCR  $20 c/p    In 1 month Carisa Stone, [facility] AUTH???  [employer] MCR  $20 c/p    [facility] AUTH???  [employer] MCR  $20 c/p    [facility] AUTH???  [employer] MCR  $20 c/p    [facility] AUTH???  [employer] MCR  $20 c/p                    Passed - EGFRCR or GFRNAA > 50     GFR Evaluation  EGFRCR: 74 , resulted on 5/1/2024             Future Appointments         Provider Department Appt Notes    Today WiCarisa de, [facility] 8 visits 12/19 to 3/19  [employer] MCR  $20 c/p    In 1 week Edil Ramos MD [facility] 4 Wk Follow Up- REF 2-3    In 1 week Carisa Stone, [facility] 8 visits 12/19 to 3/19  [employer] MCR  $20 c/p    In 2 weeks Carisa Stone, OT [facility] Occupational Therapy 8 visits 12/19 to 3/19  [employer] MCR  $20 c/p    In 2 weeks Carisa Stone, OT [facility] Occupational  Therapy 8 visits 12/19 to 3/19  [employer] MCR  $20 c/p    In 2 weeks WiCarisa de, [facility] Occupational Therapy 8 visits 12/19 to 3/19  [employer] MCR  $20 c/p    [facility] 8 visits 12/19 to 3/19  [employer] MCR  $20 c/p    [facility] 8 visits 12/19 to 3/19  [employer] MCR  $20 c/p    In [facility] AUTH???  [employer] MCR  $20 c/p    [facility] AUTH???  [employer] MCR  $20 c/p    In [facility], [facility] AUTH???  [employer] MCR  $20 c/p    In 1 month [facility], [facility] AUTH???  [employer] MCR  $20 c/p          Recent Outpatient Visits              1 week ago Orthopedic aftercare    [facility] Carisa Stone, OT    Office Visit    2 weeks ago Orthopedic aftercare    [facility] [place]    Office Visit    1 month ago [facility] fracture of left wrist[facility]    Office Visit    1 month ago [facility]osed fracture of left wrist, [facility]    Office Visit    2 months ago Closed Colles' fracture of left radius, initial encounter    [facility] [place]    Office Visit

## 2024-12-31 NOTE — PROGRESS NOTES
Diagnosis:   Post left distal radius fracture, Orthopedic aftercare (Z47.89)       Referring Provider: Edil Ramos  Date of Evaluation:    12/19/2024    Precautions:  arthritis,HTN Next MD visit: 01/08/25  Date of Surgery: n/a  Date of Injury: 09/18/24   Insurance Primary/Secondary: BCBS OUT OF STATE / N/A     # Auth Visits: 8 visits 12/19-3/19            Subjective: \"I feel better, but my shoulder is sore.\"    Pain: 2/10      Objective: See exercises flowsheet below for exercises and activities performed today. All exercises performed bilaterally.      Assessment: Pt has referred pain within her L shoulder. Pt tends to elevate her L shoulder any time she moves her L wrist. Extended time needed for all exercises as pt fatigued quickly. Pt unable to flex at the PIP or DIP at this time in her L hand, however, passively able to flex minimally.      Goals: ( 8 visits)  Pt will be independent and compliant with comprehensive HEP to maintain progress achieved in OT  Pt will increase their (L) wrist flexion to at least 50 degrees for ease of dressing  Pt will increase their (L) wrist extension  to at least 50  degrees for ease bathing  Pt will increase their (L) forearm supination and pronation  to at least 60 degrees for opening cabinets/drawers.  Pt will increase their (L) thumb JAMES by 15-20 degrees for ease of opening doors.  Pt will increase their (L) IF JAMES by 15-20 degrees for ease of manipulating buttons.  Pt will increase their (L) MF JAMES by 15-20 degrees for ease of holding steering wheel.  Pt will increase their (L) RF JAMES by 15-20 degrees for ease of holding cup.  Pt will increase their (L) SF JAMES by 15-20 degrees for ease of cupping medications.    Plan: Continue functional grasping activities   Date 12/31/2024                  Visit # 2/8                                    Evaluation                 Manual                                PROM composite flexion 10x1                 Ther ex                                   Shoulder flexion 20x1    Composite flexion 20x1    2pt pinch velcro block removal and placement    Forearm sup/pro 10x2    Wrist flex/extend 10x2    Wrist RD/UD 10x2                 HEP instruction                    Therapeutic Activity                               Sponge cupping and transportation     Large poker chip location and transportation                 Neuromuscular Re-education                                                 Modalities                                                HEP:  Assignment date:   tendon glides, AROM: wrist flex/extend, RD/UD, forearm sup/pronation, shoulder flexion towel slides 12/19/24              Charges: TA 1 (15), TE 2 (25)       Total Timed Treatment: 40 min  Total Treatment Time: 40 min

## 2025-01-01 RX ORDER — ATORVASTATIN CALCIUM 20 MG/1
20 TABLET, FILM COATED ORAL NIGHTLY
Qty: 90 TABLET | Refills: 3 | OUTPATIENT
Start: 2025-01-01

## 2025-01-01 RX ORDER — ATORVASTATIN CALCIUM 20 MG/1
20 TABLET, FILM COATED ORAL NIGHTLY
Qty: 90 TABLET | Refills: 3 | Status: SHIPPED | OUTPATIENT
Start: 2025-01-01

## 2025-01-01 NOTE — TELEPHONE ENCOUNTER
Refill passed per [facility] protocol.     Requested Prescriptions   Pending Prescriptions Disp Refills    ATORVASTATIN 20 MG Oral Tab [Pharmacy Med Name: ATORVASTATIN 20MG TABLETS] 90 tablet 3     Sig: TAKE 1 TABLET(20 MG) BY MOUTH EVERY NIGHT       Cholesterol Medication Protocol Passed - 1/1/2025 11:34 AM        Passed - ALT < 80     Lab Results   Component Value Date    ALT 20 05/01/2024             Passed - ALT resulted within past year        Passed - Lipid panel within past 12 months     Lab Results   Component Value Date    CHOLEST 178 05/01/2024    TRIG 177 (H) 05/01/2024    HDL 56 05/01/2024    LDL 95 05/01/2024    TCHDLRATIO 3.2 05/01/2024    NONHDLC 122 05/01/2024             Passed - In person appointment or virtual visit in the past 12 mos or appointment in next 3 mos     Recent Outpatient Visits              [facility]    Office Visit    1 week ago [facility]opedic aftercare    [facility]    Office Visit    3 weeks ago Orthopedic aftercare    [facility] Edil Ramos MD    Office Visit    1 month ago Open fracture of left wrist, initial encounter    [facility] Imer Sierra PA-C    Office Visit    1 month ago Closed fracture of left wrist, initial encounter    [facility] Edil Ramos MD    Office Visit          Future Appointments         Provider Department Appt Notes    In 2 days Carisa Stone OT [facility] Occup[facility] Therapy 8 visits 12/19 to 3/19  [employer] MCR  $20 c/p    In 1 week Edil Ramos MD [facility] 4 Wk Follow Up- REF 2-3    In 1 week [facility] 12/19 to 3/19  [employer] MCR  $20 c/p     In 1 week Carisa Stone, [facility] 8 visits 12/19 to 3/19  [employer] MCR  $20 c/p    In 2 weeks Carisa Stone, [facility] 8 visits 12/19 to 3/19  [employer] MCR  $20 c/p    In 2 weeks WiCarisa de, [facility] 8 visits 12/19 to 3/19  [employer] MCR  $20 c/p    [facility] 8 visits 12/19 to 3/19  [employer] MCR  $20 c/p    In 3 weeks [facility] 8 visits 12/19 to 3/19  [employer] MCR  $20 c/p    In 4 weeks Carisa Stone, [facility] AUTH???  [employer] MCR  $20 c/p    In 1 month Carisa Stone, [facility] AUTH???  [employer] MCR  $20 c/p    In 1 month [facility] AUTH???  [employer] MCR  $20 c/p    In 1 month [facility] AUTH???  [employer] MCR  $20 c/p

## 2025-01-03 ENCOUNTER — OFFICE VISIT (OUTPATIENT)
Dept: OCCUPATIONAL MEDICINE | Facility: HOSPITAL | Age: 75
End: 2025-01-03
Attending: ORTHOPAEDIC SURGERY
Payer: MEDICARE

## 2025-01-03 PROCEDURE — 97110 THERAPEUTIC EXERCISES: CPT

## 2025-01-03 PROCEDURE — 97530 THERAPEUTIC ACTIVITIES: CPT

## 2025-01-03 NOTE — PROGRESS NOTES
Diagnosis:   Post left distal radius fracture, Orthopedic aftercare (Z47.89)       Referring Provider: Edil Ramos  Date of Evaluation:    12/19/2024    Precautions:  arthritis,HTN Next MD visit: 01/08/25  Date of Surgery: n/a  Date of Injury: 09/18/24   Insurance Primary/Secondary: BCBS OUT OF STATE / N/A     # Auth Visits: 8 visits 12/19-3/19            Subjective: \"I feel a little better. My shoulder is still bugging me a lot.\"    Pain: 3-4/10      Objective: See exercises flowsheet below for exercises and activities performed today.       Assessment: Pt demo'd less guarding when performing functional gripping activities with more ability to grasp items. Min flexion at PIP and DIP. Shoulder pain persist with AROM.      Goals: ( 8 visits)  Pt will be independent and compliant with comprehensive HEP to maintain progress achieved in OT  Pt will increase their (L) wrist flexion to at least 50 degrees for ease of dressing  Pt will increase their (L) wrist extension  to at least 50  degrees for ease bathing  Pt will increase their (L) forearm supination and pronation  to at least 60 degrees for opening cabinets/drawers.  Pt will increase their (L) thumb JAMES by 15-20 degrees for ease of opening doors.  Pt will increase their (L) IF JAMES by 15-20 degrees for ease of manipulating buttons.  Pt will increase their (L) MF JAMES by 15-20 degrees for ease of holding steering wheel.  Pt will increase their (L) RF JAMES by 15-20 degrees for ease of holding cup.  Pt will increase their (L) SF JAMES by 15-20 degrees for ease of cupping medications.    Plan: Continue functional grasping activities   Date 12/31/2024   1/3/2025                Visit # 2/8  3/8                                  Evaluation                 Manual                                PROM composite flexion 10x1 PROM composite flexion 10x1, 5 sec holds               Ther ex                                  Shoulder flexion 20x1    Composite flexion 20x1    2pt  pinch velcro block removal and placement    Forearm sup/pro 10x2    Wrist flex/extend 10x2    Wrist RD/UD 10x2 Towel slides 10x2    Wrist flex/extend 10x2    Shoulder rolls 10x2    Shoulder external/internal rotation 20x1    Tendon glides 10x2    ExerStick wrist flex/extend (2 mins)    Thumb flex/extend 10x2    Thumb opposition to 3rd digit 10x1    Thumb palmar abd 10x2               HEP instruction                    Therapeutic Activity                               Sponge cupping and transportation     Large poker chip location and transportation [facility] Stacking 5 cones 4x1    Composite grasp cone slides of marbles 5x4               Neuromuscular Re-education                                                 Modalities                             Moist heat 3 mins prior to exercises                  HEP:  Assignment date:   tendon glides, AROM: wrist flex/extend, RD/UD, forearm sup/pronation, shoulder flexion towel slides 12/19/24              Charges: TA 1 (15), TE 2 (25) Total Timed Treatment: 40 min  Total Treatment Time: 40 min

## 2025-01-06 ENCOUNTER — OFFICE VISIT (OUTPATIENT)
Dept: OCCUPATIONAL MEDICINE | Facility: HOSPITAL | Age: 75
End: 2025-01-06
Attending: ORTHOPAEDIC SURGERY
Payer: MEDICARE

## 2025-01-06 PROCEDURE — 97530 THERAPEUTIC ACTIVITIES: CPT

## 2025-01-06 PROCEDURE — 97110 THERAPEUTIC EXERCISES: CPT

## 2025-01-06 NOTE — PROGRESS NOTES
Diagnosis:   Post left distal radius fracture, Orthopedic aftercare (Z47.89)       Referring Provider: Edil Ramos  Date of Evaluation:    12/19/2024    Precautions:  arthritis,HTN Next MD visit: 01/08/25  Date of Surgery: n/a  Date of Injury: 09/18/24   Insurance Primary/Secondary: BC[employer] OUT OF STATE / N/A     # Auth Visits: 8 visits 12/19-3/19            Subjective: Pt states feeling more pain than before d/t the cold.     Pain: 5/10      Objective: See exercises flowsheet below for exercises and activities performed today.       Assessment: Session began with fluidotherapy and tendon glides for tissue extensibility. Pt able to oppose to her 4th digit. Pain persist in L shoulder.      Goals: ( 8 visits)  Pt will be independent and compliant with comprehensive HEP to maintain progress achieved in OT  Pt will increase their (L) wrist flexion to at least 50 degrees for ease of dressing  Pt will increase their (L) wrist extension  to at least 50  degrees for ease bathing  Pt will increase their (L) forearm supination and pronation  to at least 60 degrees for opening cabinets/drawers.  Pt will increase their (L) thumb JAMES by 15-20 degrees for ease of opening doors.  Pt will increase their (L) IF JAMES by 15-20 degrees for ease of manipulating buttons.  Pt will increase their (L) MF JAMES by 15-20 degrees for ease of holding steering wheel.  Pt will increase their (L) RF JAMES by 15-20 degrees for ease of holding cup.  Pt will increase their (L) SF JAMES by 15-20 degrees for ease of cupping medications.    Plan: Continue functional grasping activities   Date 12/31/2024   1/3/2025   1/6/2025             Visit # 2/8  3/8  4/8                                Evaluation                 Manual                                PROM composite flexion 10x1 PROM composite flexion 10x1, 5 sec holds ECU pin and stretch 10x1             Ther ex                                  Shoulder flexion 20x1    Composite flexion 20x1    2pt  pinch velcro block removal and placement    Forearm sup/pro 10x2    Wrist flex/extend 10x2    Wrist RD/UD 10x2 Towel slides 10x2    Wrist flex/extend 10x2    Shoulder rolls 10x2    Shoulder external/internal rotation 20x1    Tendon glides 10x2    ExerStick wrist flex/extend (2 mins)    Thumb flex/extend 10x2    Thumb opposition to 3rd digit 10x1    Thumb palmar abd 10x2 Fluidotherapy with tendon glides (8 mins)    Composite flexion 10x2    Opposition 10x2    Joint blocking PIP and DIP flexion 10x2    Thumb flex/extend 10x2    Wrist flex/extend 10x2    Wrist RD/UD 10x2               HEP instruction                    Therapeutic Activity                               Sponge cupping and transportation     Large poker chip location and transportation [facility] Stacking 5 cones 4x1    Composite grasp cone slides of marbles 5x4 Alternating tip pinch pom-pom transportation (8 mins)             Neuromuscular Re-education                                                 Modalities                             Moist heat 3 mins prior to exercises                  HEP:  Assignment date:   tendon glides, AROM: wrist flex/extend, RD/UD, forearm sup/pronation, shoulder flexion towel slides 12/19/24   Joint blocking at PIP 1/6/2025                 Charges: TA 1 (8), TE 2 (35) Total Timed Treatment: 43 min  Total Treatment Time: 43 min

## 2025-01-08 ENCOUNTER — HOSPITAL ENCOUNTER (OUTPATIENT)
Dept: GENERAL RADIOLOGY | Facility: HOSPITAL | Age: 75
Discharge: HOME | End: 2025-01-08
Attending: ORTHOPAEDIC SURGERY
Payer: MEDICARE

## 2025-01-08 ENCOUNTER — OFFICE VISIT (OUTPATIENT)
Dept: ORTHOPEDICS CLINIC | Facility: CLINIC | Age: 75
End: 2025-01-08
Payer: MEDICARE

## 2025-01-08 ENCOUNTER — OFFICE VISIT (OUTPATIENT)
Dept: OCCUPATIONAL MEDICINE | Facility: HOSPITAL | Age: 75
End: 2025-01-08
Attending: ORTHOPAEDIC SURGERY
Payer: MEDICARE

## 2025-01-08 VITALS — HEIGHT: 66 IN | BODY MASS INDEX: 27.16 KG/M2 | WEIGHT: 169 LBS

## 2025-01-08 DIAGNOSIS — Z47.89 ORTHOPEDIC AFTERCARE: Primary | ICD-10-CM

## 2025-01-08 DIAGNOSIS — M75.52 SUBACROMIAL BURSITIS OF LEFT SHOULDER JOINT: ICD-10-CM

## 2025-01-08 DIAGNOSIS — Z47.89 ORTHOPEDIC AFTERCARE: ICD-10-CM

## 2025-01-08 DIAGNOSIS — M25.632 STIFFNESS OF LEFT WRIST JOINT: ICD-10-CM

## 2025-01-08 PROCEDURE — 20610 DRAIN/INJ JOINT/BURSA W/O US: CPT | Performed by: ORTHOPAEDIC SURGERY

## 2025-01-08 PROCEDURE — 1125F AMNT PAIN NOTED PAIN PRSNT: CPT | Performed by: ORTHOPAEDIC SURGERY

## 2025-01-08 PROCEDURE — 73030 X-RAY EXAM OF SHOULDER: CPT | Performed by: ORTHOPAEDIC SURGERY

## 2025-01-08 PROCEDURE — 1160F RVW MEDS BY RX/DR IN RCRD: CPT | Performed by: ORTHOPAEDIC SURGERY

## 2025-01-08 PROCEDURE — 3008F BODY MASS INDEX DOCD: CPT | Performed by: ORTHOPAEDIC SURGERY

## 2025-01-08 PROCEDURE — 97110 THERAPEUTIC EXERCISES: CPT

## 2025-01-08 PROCEDURE — 1159F MED LIST DOCD IN RCRD: CPT | Performed by: ORTHOPAEDIC SURGERY

## 2025-01-08 PROCEDURE — 73110 X-RAY EXAM OF WRIST: CPT | Performed by: ORTHOPAEDIC SURGERY

## 2025-01-08 PROCEDURE — 99213 OFFICE O/P EST LOW 20 MIN: CPT | Performed by: ORTHOPAEDIC SURGERY

## 2025-01-08 RX ORDER — TRIAMCINOLONE ACETONIDE 40 MG/ML
40 INJECTION, SUSPENSION INTRA-ARTICULAR; INTRAMUSCULAR ONCE
Status: COMPLETED | OUTPATIENT
Start: 2025-01-08 | End: 2025-01-08

## 2025-01-08 RX ADMIN — TRIAMCINOLONE ACETONIDE 40 MG: 40 INJECTION, SUSPENSION INTRA-ARTICULAR; INTRAMUSCULAR at 14:08:00

## 2025-01-08 NOTE — PROGRESS NOTES
Per verbal order from Dr. Ramos, draw up and 4ml of 0.5% Marcaine and 1ml of Kenalog 40 for injection into left shoulder. Fariba CANNON MA  Patient provided education handout for cortisone injection.

## 2025-01-08 NOTE — PROGRESS NOTES
Diagnosis:   Post left distal radius fracture, Orthopedic aftercare (Z47.89)       Referring Provider: Edil Ramos  Date of Evaluation:    12/19/2024    Precautions:  arthritis,HTN Next MD visit: 01/08/25  Date of Surgery: n/a  Date of Injury: 09/18/24   Insurance Primary/Secondary: BCBS OUT OF STATE / N/A     # Auth Visits: 8 visits 12/19-3/19            Subjective: Pt states receiving a cortisone injection to her L shoulder    Pain: 5/10      Objective: See exercises flowsheet below for exercises and activities performed today.       Assessment: Pt demo'd more PIP and DIP flexion in the L hand. L SF most flexible. Pt unable to oppose thumb to a functional level, pt compensated with flexion.     Goals: ( 8 visits)  Pt will be independent and compliant with comprehensive HEP to maintain progress achieved in OT  Pt will increase their (L) wrist flexion to at least 50 degrees for ease of dressing  Pt will increase their (L) wrist extension  to at least 50  degrees for ease bathing  Pt will increase their (L) forearm supination and pronation  to at least 60 degrees for opening cabinets/drawers.  Pt will increase their (L) thumb JAMES by 15-20 degrees for ease of opening doors.  Pt will increase their (L) IF JAMES by 15-20 degrees for ease of manipulating buttons.  Pt will increase their (L) MF JAMES by 15-20 degrees for ease of holding steering wheel.  Pt will increase their (L) RF JAMES by 15-20 degrees for ease of holding cup.  Pt will increase their (L) SF JAMES by 15-20 degrees for ease of cupping medications.    Plan: Continue functional grasping activities   Date 12/31/2024   1/3/2025   1/6/2025  1/8/2025           Visit # 2/8  3/8  4/8 5/8                              Evaluation                 Manual                                PROM composite flexion 10x1 PROM composite flexion 10x1, 5 sec holds ECU pin and stretch 10x1             Ther ex                                  Shoulder flexion 20x1    Composite  flexion 20x1    2pt pinch velcro block removal and placement    Forearm sup/pro 10x2    Wrist flex/extend 10x2    Wrist RD/UD 10x2 Towel slides 10x2    Wrist flex/extend 10x2    Shoulder rolls 10x2    Shoulder external/internal rotation 20x1    Tendon glides 10x2    ExerStick wrist flex/extend (2 mins)    Thumb flex/extend 10x2    Thumb opposition to 3rd digit 10x1    Thumb palmar abd 10x2 Fluidotherapy with tendon glides (8 mins)    Composite flexion 10x2    Opposition 10x2    Joint blocking PIP and DIP flexion 10x2    Thumb flex/extend 10x2    Wrist flex/extend 10x2    Wrist RD/UD 10x2   Fluidotherapy with tendon glides (8 mins)    Tennis ball palmar abduction 10x2    Opposition 10x2    Joint blocking PIP and DIP flexion 10x2      Thumb flex/extend 10x2    Wrist flex/extend while holding dowel 10x2    RD/UD 10x2 with dowel    Dart throwers 10x2    ExerStick wrist flex/extend (2 mins x2)           HEP instruction                    Therapeutic Activity                               Sponge cupping and transportation     Large poker chip location and transportation [facility] Stacking 5 cones 4x1    Composite grasp cone slides of marbles 5x4 Alternating tip pinch pom-pom transportation (8 mins)             Neuromuscular Re-education                                                 Modalities                             Moist heat 3 mins prior to exercises                  HEP:  Assignment date:   tendon glides, AROM: wrist flex/extend, RD/UD, forearm sup/pronation, shoulder flexion towel slides 12/19/24   Joint blocking at PIP 1/6/2025             Charges: TE 3  Total Timed Treatment: 42 min  Total Treatment Time: 42 min

## 2025-01-08 NOTE — PROGRESS NOTES
NURSING INTAKE COMMENTS:   Chief Complaint   Patient presents with    Fracture     Left wrist fracture, pt in for f/u, doing good today, pain is 5/10, still in OT with improvements       HPI: This 74 year old female presents today with complaints of left shoulder pain.  She reports that her left wrist is feeling much better.  She has been attending physical therapy.  The shoulder pain began spontaneously, but is difficult for her to raise the arm.    Past Medical History:    Arthritis    Bell's palsy    Colon adenomas    #3    Goiter    High blood pressure    High cholesterol    Hyperlipidemia     Past Surgical History:   Procedure Laterality Date    Colonoscopy  12/2000    [facility]    Colonoscopy      [facility]    Colonoscopy N/A 10/13/2020    Procedure: COLONOSCOPY;  Surgeon: Jered Green MD;  Location: [facility] ENDO    Colonoscopy N/A 8/14/2023    Procedure: COLONOSCOPY;  Surgeon: Tavon Rice MD;  Location: [facility]     Current Outpatient Medications   Medication Sig Dispense Refill    atorvastatin 20 MG Oral Tab Take 1 tablet (20 mg total) by mouth nightly. 90 tablet 3    losartan 50 MG Oral Tab Take 1 tablet (50 mg total) by mouth daily. 90 tablet 3     Allergies[1]  Family History   Problem Relation Age of Onset    Heart Disorder Father     Renal Disease Mother     Hypertension Sister     Breast Cancer Neg     Ovarian Cancer Neg        Social History     Occupational History    Not on file   Tobacco Use    Smoking status: Never     Passive exposure: Never    Smokeless tobacco: Never   Vaping Use    Vaping status: Never Used   Substance and Sexual Activity    Alcohol use: Never    Drug use: Never    Sexual activity: Not on file        Review of Systems:  GENERAL: feels generally well, no significant weight loss or weight gain  SKIN: no ulcerated or worrisome skin lesions  EYES:denies blurred vision or double vision  HEENT: denies new nasal congestion, sinus pain or ST  LUNGS: denies shortness of  breath  CARDIOVASCULAR: denies chest pain  GI: no hematemesis, no worsening heartburn, no diarrhea  : no dysuria, no blood in urine, no difficulty urinating, no incontinence  MUSCULOSKELETAL: no other musculoskeletal complaints other than in HPI  NEURO: no numbness or tingling, no weakness or balance disorder  PSYCHE: no depression or anxiety  HEMATOLOGIC: no hx of blood dyscrasia  ENDOCRINE: no thyroid or diabetes issues  ALL/ASTHMA: no new hx of severe allergy or asthma    Physical Examination:    Ht 5' 6\" (1.676 m)   Wt 169 lb (76.7 kg)   BMI 27.28 kg/m²   Constitutional: appears well hydrated, alert and responsive, no acute distress noted  Extremities: Full passive range of motion of the left shoulder with a positive impingement sign.  Pain with active elevation beyond 90 degrees.  Decreased swelling in the left wrist.  No pain with range of motion.    Imaging: Progressive healing of impacted left distal radius fracture.  Osteopenia with mild degenerative change of the left shoulder.  No fractures.    XR WRIST COMPLETE (MIN 3 VIEWS), LEFT (CPT=73110)    Result Date: 12/12/2024  PROCEDURE: XR WRIST COMPLETE (MIN 3 VIEWS), LEFT (CPT=73110)  COMPARISON: [address], XR WRIST COMPLETE (MIN 3 VIEWS), LEFT (CPT=73110), 11/25/2024, 1:41 PM.  [address], XR WRIST COMPLETE (MIN 3 VIEWS), LEFT (CPT=73110), 11/11/2024, 2:42 PM.  [address], XR WRIST COMPLETE (MIN 3 VIEWS), LEFT (CPT=73110), 10/28/2024, 11:50 AM.  INDICATIONS: follow up left wrist FX  TECHNIQUE: 3 views were obtained.   FINDINGS:  BONES: Again visualized is a transverse impacted fracture of the distal radius with dorsal angulation.  There has been progressive interval increase of callus formation and bony bridging.  The fracture lines are still clearly visible.  Nondisplaced ulnar  styloid fracture.  Severe osteopenia. There is severe narrowing at the  first CMC joint with subchondral sclerosis and cystic change with osteophytes seen within the trapezium and base of the first metacarpal. Bony remodeling is seen of the trapezium. SOFT TISSUES: Soft tissue swelling within the distal wrist EFFUSION: None visible. OTHER: Negative.         CONCLUSION:   Healing distal radial fracture with impaction and dorsal angulation.  Nondisplaced ulnar styloid fracture.  Severe osteopenia.  Severe osteoarthritis of the thumb carpometacarpal joint.     Dictated by (CST): Gerardo Shelton MD on 12/12/2024 at 10:21 AM     Finalized by (CST): Gerardo Shelton MD on 12/12/2024 at 10:21 AM             Lab Results   Component Value Date    WBC 6.6 05/01/2024    HGB 13.3 05/01/2024     05/01/2024      Lab Results   Component Value Date     (H) 05/01/2024    BUN 11 05/01/2024    CREATSERUM 0.83 05/01/2024    GFRNAA 83 09/17/2021    GFRAA 97 09/17/2021        Assessment and Plan:  Diagnoses and all orders for this visit:    Orthopedic aftercare  -     XR WRIST COMPLETE (MIN 3 VIEWS), LEFT (CPT=73110); Future  -     XR SHOULDER, COMPLETE (MIN 2 VIEWS), LEFT (CPT=73030); Future    Stiffness of left wrist joint    Subacromial bursitis of left shoulder joint  -     arthrocentesis major joint  -     triamcinolone acetonide (Kenalog-40) 40 MG/ML injection 40 mg        Assessment: Improving left wrist fracture.  Subacromial bursitis of the left shoulder.    Procedure: The risks and benefits of a cortisone injection were discussed with the patient.  An informational sheet was also provided and the patient had ample time to review it.  Under sterile preparation, the left shoulder was injected with 40 mg of Kenalog and 4 cc 0.5% marcaine.  The patient tolerated the procedure well.    Plan: I injected subacromial space with local anesthetic and cortisone and advised continued therapy for the wrist.  Follow-up as needed.    The above note was creating using Dragon speech recognition technology.  Please excuse any typos.    TATO ALLEN MD       [1] No Known Allergies

## 2025-01-14 ENCOUNTER — OFFICE VISIT (OUTPATIENT)
Dept: OCCUPATIONAL MEDICINE | Facility: HOSPITAL | Age: 75
End: 2025-01-14
Attending: ORTHOPAEDIC SURGERY
Payer: MEDICARE

## 2025-01-14 PROCEDURE — 97110 THERAPEUTIC EXERCISES: CPT

## 2025-01-16 ENCOUNTER — OFFICE VISIT (OUTPATIENT)
Dept: OCCUPATIONAL MEDICINE | Facility: HOSPITAL | Age: 75
End: 2025-01-16
Attending: ORTHOPAEDIC SURGERY
Payer: MEDICARE

## 2025-01-16 PROCEDURE — 97530 THERAPEUTIC ACTIVITIES: CPT

## 2025-01-16 PROCEDURE — 97110 THERAPEUTIC EXERCISES: CPT

## 2025-01-16 PROCEDURE — 97140 MANUAL THERAPY 1/> REGIONS: CPT

## 2025-01-16 NOTE — PROGRESS NOTES
Diagnosis:   Post left distal radius fracture, Orthopedic aftercare (Z47.89)       Referring Provider: Edil Ramos  Date of Evaluation:    12/19/2024    Precautions:  arthritis,HTN Next MD visit: none scheduled  Date of Surgery: n/a  Date of Injury: 09/18/24   Insurance Primary/Secondary: BCBS OUT OF STATE / N/A     # Auth Visits: 8 visits 12/19-3/19            Subjective: Pt states she is trying her best to use her Left hand.    Pain: 3/10      Objective: See exercises flowsheet below for exercises and activities performed today.       Assessment: Continued STR in PIP and DIP flexion. Heavy encouragement to flex at these joints. Pain limiting factor.  Multiple rest breaks in between exercises. As session progressed, pt became more functional with her left hand.       Goals: ( 8 visits)  Pt will be independent and compliant with comprehensive HEP to maintain progress achieved in OT  Pt will increase their (L) wrist flexion to at least 50 degrees for ease of dressing  Pt will increase their (L) wrist extension  to at least 50  degrees for ease bathing  Pt will increase their (L) forearm supination and pronation  to at least 60 degrees for opening cabinets/drawers.  Pt will increase their (L) thumb JAMES by 15-20 degrees for ease of opening doors.  Pt will increase their (L) IF JAMES by 15-20 degrees for ease of manipulating buttons.  Pt will increase their (L) MF JAMES by 15-20 degrees for ease of holding steering wheel.  Pt will increase their (L) RF JAMES by 15-20 degrees for ease of holding cup.  Pt will increase their (L) SF JAMES by 15-20 degrees for ease of cupping medications.    Plan: Continue functional grasping activities, 1 more visits then progress note.  Date 12/31/2024   1/3/2025   1/6/2025  1/8/2025  1/14/2025   1/16/2025       Visit # 2/8  3/8  4/8 5/8  6/8  7/8                          Evaluation                 Manual                                PROM composite flexion 10x1 PROM composite flexion  10x1, 5 sec holds ECU pin and stretch 10x1   STM to dorsum of forearm and hand (8 mins)  STM to thenar eminence and web space (8 mins)       Ther ex                                  Shoulder flexion 20x1    Composite flexion 20x1    2pt pinch velcro block removal and placement    Forearm sup/pro 10x2    Wrist flex/extend 10x2    Wrist RD/UD 10x2 Towel slides 10x2    Wrist flex/extend 10x2    Shoulder rolls 10x2    Shoulder external/internal rotation 20x1    Tendon glides 10x2    ExerStick wrist flex/extend (2 mins)    Thumb flex/extend 10x2    Thumb opposition to 3rd digit 10x1    Thumb palmar abd 10x2 Fluidotherapy with tendon glides (8 mins)    Composite flexion 10x2    Opposition 10x2    Joint blocking PIP and DIP flexion 10x2    Thumb flex/extend 10x2    Wrist flex/extend 10x2    Wrist RD/UD 10x2   Fluidotherapy with tendon glides (8 mins)    Tennis ball palmar abduction 10x2    Opposition 10x2    Joint blocking PIP and DIP flexion 10x2      Thumb flex/extend 10x2    Wrist flex/extend while holding dowel 10x2    RD/UD 10x2 with dowel    Dart throwers 10x2    ExerStick wrist flex/extend (2 mins x2) Tendon glides one position at a time 10x1    Wooden block manipulation:   -cupping  -alternating tip pinch transportation  -thumb<>palm translation  (10 mins)    Composite digital flexion around red flexbar 10x3    ExerStick wrist flex/extend (2 mins x2)    Thumb flex/extend 10x2    Wrist flex/extend off ramp 30x1    Red web  -weight bearing extension presses 10x2 Tendon glides one position at a time 10x1    Alternating tip pinch marble transportation    2pt pinch peg placement onto board (34 pegs)    Lumbrical strengthening through use of clamp to flex Mps with PIP/DIP straight to grasp and transport cotton balls from table to container     Wrist flex/extend with composite digital flexion 10x2    Thumb flex/extend 10x2    Thumb joint blocking into flexion IP 10x2    Radial abduction of thumb 10x2    Palmar abduction        HEP instruction                    Therapeutic Activity                               Sponge cupping and transportation     Large poker chip location and transportation [facility] Stacking 5 cones 4x1    Composite grasp cone slides of marbles 5x4 Alternating tip pinch pom-pom transportation (8 mins)     Functional cupping of marbles and transportation into container          Neuromuscular Re-education                                                 Modalities                             Moist heat 3 mins prior to exercises                  HEP:  Assignment date:   tendon glides, AROM: wrist flex/extend, RD/UD, forearm sup/pronation, shoulder flexion towel slides 12/19/24   Joint blocking at PIP 1/6/2025           Charges: TA 1 (10), TE 2 (35), MT 1 (8)  Total Timed Treatment: 53 min  Total Treatment Time: 53 min

## 2025-01-20 ENCOUNTER — APPOINTMENT (OUTPATIENT)
Dept: OCCUPATIONAL MEDICINE | Facility: HOSPITAL | Age: 75
End: 2025-01-20
Attending: ORTHOPAEDIC SURGERY
Payer: MEDICARE

## 2025-01-22 ENCOUNTER — OFFICE VISIT (OUTPATIENT)
Dept: OCCUPATIONAL MEDICINE | Facility: HOSPITAL | Age: 75
End: 2025-01-22
Attending: ORTHOPAEDIC SURGERY
Payer: MEDICARE

## 2025-01-22 PROCEDURE — 97110 THERAPEUTIC EXERCISES: CPT

## 2025-01-22 NOTE — PROGRESS NOTES
Diagnosis:   Post left distal radius fracture, Orthopedic aftercare (Z47.89)       Referring Provider: Edil Ramos  Date of Evaluation:    12/19/2024    Precautions:  arthritis,HTN Next MD visit: none scheduled  Date of Surgery: n/a  Date of Injury: 09/18/24   Insurance Primary/Secondary: [employer] OUT OF STATE / N/A     # Auth Visits: 8 visits 12/19-3/19              Progress Summary  Pt has attended 8  visits in Occupational Therapy.     Subjective: Pt states I still need more therapy. Pt feels her hand is better but still weak and stiff.       Assessment: Pt making gains throughout skilled OT sessions, as seen below. Pt remains rigid with STR in all her left hand PIP and DIP joints resulting in incomplete composite flexion. Pt made improvement in all areas of AROM, however, is not a a functional level. Pt would benefit from continued skilled OT to regain functionality. Goals update to reflect progression. OT requesting 8 additional visits.       Objective Data:     ROM: (* denotes performed with pain)  Elbow Eval Elbow 1/22/2025 Wrist Eval Wrist 1/22/2025   Supination: R 75, L 45  Pronation: R 80, L 50 Supination: L 65  Pronation: L 60 Flexion: R 65, L 20  Extension: R 55, L 15  Ulnar Deviation: R 25, L 0  Radial Deviation R 5, L 0 Flexion: L 30  Extension: L 30  Ulnar Deviation: L 10  Radial Deviation L 10     AROM/PROM:(Degrees)  LEFT HAND Eval:    Thumb IF MF RF SF   MP 0/20 0/65 0/75 0/60 0/45   PIP  0/0 0/0 0/0 0/0   DIP 0/10 0/0 0/0 0/0 0/0   JAMES 30 65 75 60 45     LEFT HAND 1/22/2025:    Thumb IF MF RF SF   MP 0/38 0/75 0/80 0/75 0/65   PIP  0/35 0/34 0/30 0/34   DIP 0/35 0/25 0/18 0/20 0/30   JAMES 73  135 132 125 129       Strength (lbs) Right Average Left Average   : 31 6       Goals: ( 8 visits)  Pt will be independent and compliant with comprehensive HEP to maintain progress achieved in OT-MET, updated  Pt will increase their (L) wrist flexion to at least 50 degrees for ease of  dressing-PROGRESSING TOWARDS  Pt will increase their (L) wrist extension  to at least 50  degrees for ease bathing-PROGRESSING TOWARDS  Pt will increase their (L) forearm supination and pronation  to at least 60 degrees for opening cabinets/drawers.- MET  Pt will increase their (L) thumb JAMES by 15-20 degrees for ease of opening doors.- MET  Pt will increase their (L) IF JAMES by 15-20 degrees for ease of manipulating buttons.- MET  Pt will increase their (L) MF JAMES by 15-20 degrees for ease of holding steering wheel.- MET  Pt will increase their (L) RF JAMES by 15-20 degrees for ease of holding cup.- MET  Pt will increase their (L) SF JAMES by 15-20 degrees for ease of cupping medications.- MET  Updated Goals 1/22/2025:   Pt will increase their (L) thumb JAMES to at least 100 degrees for ease of opening doors.  Pt will increase their (L) IF JAMES to at least 200 degrees for ease of manipulating buttons.  Pt will increase their (L) MF JAMES to at least 200 degrees for ease of holding steering wheel.  Pt will increase their (L) RF JAMES to at least 200  degrees for ease of holding cup.  Pt will increase their (L) SF JAMES to at least 200 degrees for ease of cupping medications.  Pt will increase their (L)  strength to at least 20# for ease of carying groceries.    Rehab Potential: good    Plan: Continue skilled Occupational Therapy  2x/week or a total of 8 visits over a 90 day period. Treatment will include: Manual Therapy, Soft tissue mobilization, AROM, PROM, Strengthening, Therapeutic Activity, Moist heat, cryotherapy, Ultrasound, Whirlpool (fluidotherapy), Paraffin, Electrical Stim, Orthosis,  Neuromuscular Re-education, Patient education, Home exercise program,           Patient/Family/Caregiver was advised of these findings, precautions, and treatment options and has agreed to actively participate in planning and for this course of care.    Thank you for your referral. If you have any questions, please contact me at Dept:  [phone].    Sincerely,  Electronically signed by therapist: Carisa Stone OT    Physician's certification required: Yes  Please co-sign or sign and return this letter via fax as soon as possible to [phone].   I certify the need for these services furnished under this plan of treatment and while under my care.    X___________________________________________________ Date____________________    Certification From: 1/22/2025  To:4/22/2025                  Treatment Objective: See exercises flowsheet below for exercises and activities performed today.       Date 12/31/2024   1/3/2025   1/6/2025  1/8/2025  1/14/2025   1/16/2025  1/22/2025      Visit # 2/8  3/8  4/8 5/8  6/8  7/8  8/8                        Evaluation                 Manual                                PROM composite flexion 10x1 PROM composite flexion 10x1, 5 sec holds ECU pin and stretch 10x1   STM to dorsum of forearm and hand (8 mins)  STM to thenar eminence and web space (8 mins)       Ther ex                                  Shoulder flexion 20x1    Composite flexion 20x1    2pt pinch velcro block removal and placement    Forearm sup/pro 10x2    Wrist flex/extend 10x2    Wrist RD/UD 10x2 Towel slides 10x2    Wrist flex/extend 10x2    Shoulder rolls 10x2    Shoulder external/internal rotation 20x1    Tendon glides 10x2    ExerStick wrist flex/extend (2 mins)    Thumb flex/extend 10x2    Thumb opposition to 3rd digit 10x1    Thumb palmar abd 10x2 Fluidotherapy with tendon glides (8 mins)    Composite flexion 10x2    Opposition 10x2    Joint blocking PIP and DIP flexion 10x2    Thumb flex/extend 10x2    Wrist flex/extend 10x2    Wrist RD/UD 10x2   Fluidotherapy with tendon glides (8 mins)    Tennis ball palmar abduction 10x2    Opposition 10x2    Joint blocking PIP and DIP flexion 10x2      Thumb flex/extend 10x2    Wrist flex/extend while holding dowel 10x2    RD/UD 10x2 with dowel    Dart throwers 10x2    ExerStick wrist flex/extend (2  mins x2) Tendon glides one position at a time 10x1    Wooden block manipulation:   -cupping  -alternating tip pinch transportation  -thumb<>palm translation  (10 mins)    Composite digital flexion around red flexbar 10x3    ExerStick wrist flex/extend (2 mins x2)    Thumb flex/extend 10x2    Wrist flex/extend off ramp 30x1    Red web  -weight bearing extension presses 10x2 Tendon glides one position at a time 10x1    Alternating tip pinch marble transportation    2pt pinch peg placement onto board (34 pegs)    Lumbrical strengthening through use of clamp to flex Mps with PIP/DIP straight to grasp and transport cotton balls from table to container     Wrist flex/extend with composite digital flexion 10x2    Thumb flex/extend 10x2    Thumb joint blocking into flexion IP 10x2    Radial abduction of thumb 10x2    Palmar abduction Tendon glides one position at a time 10x1    PIP and DIP joint blocking flexion 10x2    Thumb flex/extend 10x2    Wrist flex/extend off ramp 30x1    Alternating tip pinch large peg placement onto board into cupping of two at a time     HEP instruction                    Therapeutic Activity                               Sponge cupping and transportation     Large poker chip location and transportation [facility] Stacking 5 cones 4x1    Composite grasp cone slides of marbles 5x4 Alternating tip pinch pom-pom transportation (8 mins)     Functional cupping of marbles and transportation into container          Neuromuscular Re-education                                                 Modalities                             Moist heat 3 mins prior to exercises                  HEP:  Assignment date:   tendon glides, AROM: wrist flex/extend, RD/UD, forearm sup/pronation, shoulder flexion towel slides 12/19/24   Joint blocking at PIP 1/6/2025           Charges: TE 3  Total Timed Treatment: 40 min  Total Treatment Time: 45 min

## 2025-01-27 ENCOUNTER — OFFICE VISIT (OUTPATIENT)
Dept: OCCUPATIONAL MEDICINE | Facility: HOSPITAL | Age: 75
End: 2025-01-27
Attending: ORTHOPAEDIC SURGERY
Payer: MEDICARE

## 2025-01-27 PROCEDURE — 97110 THERAPEUTIC EXERCISES: CPT

## 2025-01-27 NOTE — PROGRESS NOTES
Diagnosis:   Post left distal radius fracture, Orthopedic aftercare (Z47.89)       Referring Provider: Edil Ramos  Date of Evaluation:    12/19/2024    Precautions:  arthritis,HTN Next MD visit: none scheduled  Date of Surgery: n/a  Date of Injury: 09/18/24   Insurance Primary/Secondary: [employer] OUT OF STATE / N/A     # Auth Visits: 16 visits 12/19-4/24            Subjective: Pt states today her arm hurts a little more.     Pain: 3-4/10      Objective: See exercises flowsheet below for exercises and activities performed today.       Assessment: Pt demo'd improved composite flexion compared to other session. Slight flexion noted in DIP and PIP joints of left hand. Pt actually able to cup more than one marble today, 3-4 marbles achieved at a time.       Goals: (16 visits)  Pt will be independent and compliant with comprehensive HEP to maintain progress achieved in OT-MET, updated  Pt will increase their (L) wrist flexion to at least 50 degrees for ease of dressing-PROGRESSING TOWARDS  Pt will increase their (L) wrist extension  to at least 50  degrees for ease bathing-PROGRESSING TOWARDS  Pt will increase their (L) forearm supination and pronation  to at least 60 degrees for opening cabinets/drawers.- MET  Pt will increase their (L) thumb JAMES by 15-20 degrees for ease of opening doors.- MET  Pt will increase their (L) IF JAMES by 15-20 degrees for ease of manipulating buttons.- MET  Pt will increase their (L) MF JAMES by 15-20 degrees for ease of holding steering wheel.- MET  Pt will increase their (L) RF JAMES by 15-20 degrees for ease of holding cup.- MET  Pt will increase their (L) SF JMAES by 15-20 degrees for ease of cupping medications.- MET  Updated Goals 1/22/2025:   Pt will increase their (L) thumb JAMES to at least 100 degrees for ease of opening doors.  Pt will increase their (L) IF JAMES to at least 200 degrees for ease of manipulating buttons.  Pt will increase their (L) MF JAMES to at least 200 degrees for  ease of holding steering wheel.  Pt will increase their (L) RF JAMES to at least 200  degrees for ease of holding cup.  Pt will increase their (L) SF JAMES to at least 200 degrees for ease of cupping medications.  Pt will increase their (L)  strength to at least 20# for ease of carying groceries.    Plan: continue gripping activities, add some strengthening exercises to HEP next session  Date 12/31/2024   1/3/2025   1/6/2025  1/8/2025  1/14/2025   1/16/2025  1/22/2025   1/27/2025    Visit # 2/8  3/8  4/8 5/8  6/8  7/8  8/8 9/16                       Evaluation                 Manual                                PROM composite flexion 10x1 PROM composite flexion 10x1, 5 sec holds ECU pin and stretch 10x1   STM to dorsum of forearm and hand (8 mins)  STM to thenar eminence and web space (8 mins)   STM while passively ranging into composite flexion   Ther ex                                  Shoulder flexion 20x1    Composite flexion 20x1    2pt pinch velcro block removal and placement    Forearm sup/pro 10x2    Wrist flex/extend 10x2    Wrist RD/UD 10x2 Towel slides 10x2    Wrist flex/extend 10x2    Shoulder rolls 10x2    Shoulder external/internal rotation 20x1    Tendon glides 10x2    ExerStick wrist flex/extend (2 mins)    Thumb flex/extend 10x2    Thumb opposition to 3rd digit 10x1    Thumb palmar abd 10x2 Fluidotherapy with tendon glides (8 mins)    Composite flexion 10x2    Opposition 10x2    Joint blocking PIP and DIP flexion 10x2    Thumb flex/extend 10x2    Wrist flex/extend 10x2    Wrist RD/UD 10x2   Fluidotherapy with tendon glides (8 mins)    Tennis ball palmar abduction 10x2    Opposition 10x2    Joint blocking PIP and DIP flexion 10x2      Thumb flex/extend 10x2    Wrist flex/extend while holding dowel 10x2    RD/UD 10x2 with dowel    Dart throwers 10x2    ExerStick wrist flex/extend (2 mins x2) Tendon glides one position at a time 10x1    Wooden block manipulation:   -cupping  -alternating tip pinch  transportation  -thumb<>palm translation  (10 mins)    Composite digital flexion around red flexbar 10x3    ExerStick wrist flex/extend (2 mins x2)    Thumb flex/extend 10x2    Wrist flex/extend off ramp 30x1    Red web  -weight bearing extension presses 10x2 Tendon glides one position at a time 10x1    Alternating tip pinch marble transportation    2pt pinch peg placement onto board (34 pegs)    Lumbrical strengthening through use of clamp to flex Mps with PIP/DIP straight to grasp and transport cotton balls from table to container     Wrist flex/extend with composite digital flexion 10x2    Thumb flex/extend 10x2    Thumb joint blocking into flexion IP 10x2    Radial abduction of thumb 10x2    Palmar abduction Tendon glides one position at a time 10x1    PIP and DIP joint blocking flexion 10x2    Thumb flex/extend 10x2    Wrist flex/extend off ramp 30x1    Alternating tip pinch large peg placement onto board into cupping of two at a time Composite digital flexion into red web 10x1, 10 secs    Red web  10x2    Red web lumbrical squeeze 10x2    Dart throwers with tennis ball 10x2    Rubber band digital extension, abduction 10x2   HEP instruction                    Therapeutic Activity                               Sponge cupping and transportation     Large poker chip location and transportation [facility] Stacking 5 cones 4x1    Composite grasp cone slides of marbles 5x4 Alternating tip pinch pom-pom transportation (8 mins)     Functional cupping of marbles and transportation into container      Functional cupping of marbles and transportation into container (3-4 at a time)   Neuromuscular Re-education                                                 Modalities                             Moist heat 3 mins prior to exercises           Moist heat 3 mins prior to exercises      HEP:  Assignment date:   tendon glides, AROM: wrist flex/extend, RD/UD, forearm sup/pronation, shoulder flexion towel slides 12/19/24   Joint  blocking at [facility] 1/6/2025           Charges: TE 3  Total Timed Treatment: 40 min  Total Treatment Time: 43 min

## 2025-01-30 ENCOUNTER — OFFICE VISIT (OUTPATIENT)
Dept: OCCUPATIONAL MEDICINE | Facility: HOSPITAL | Age: 75
End: 2025-01-30
Attending: ORTHOPAEDIC SURGERY
Payer: MEDICARE

## 2025-01-30 PROCEDURE — 97110 THERAPEUTIC EXERCISES: CPT

## 2025-01-30 NOTE — PROGRESS NOTES
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    Pain: 3/10      Objective: See exercises flowsheet below for exercises and activities performed today.       Assessment: Strengthening exercises initiated with blue sponge. OT trained and demo'd sponge/therafoam exercises to patient, pt returned demo with min v/c until able to display independence. Sponge and handout provided to pt.      Goals: (16 visits)  Pt will be independent and compliant with comprehensive HEP to maintain progress achieved in OT-MET, updated  Pt will increase their (L) wrist flexion to at least 50 degrees for ease of dressing-PROGRESSING TOWARDS  Pt will increase their (L) wrist extension  to at least 50  degrees for ease bathing-PROGRESSING TOWARDS  Pt will increase their (L) forearm supination and pronation  to at least 60 degrees for opening cabinets/drawers.- MET  Pt will increase their (L) thumb JAMES by 15-20 degrees for ease of opening doors.- MET  Pt will increase their (L) IF JAMES by 15-20 degrees for ease of manipulating buttons.- MET  Pt will increase their (L) MF JAMES by 15-20 degrees for ease of holding steering wheel.- MET  Pt will increase their (L) RF JAMES by 15-20 degrees for ease of holding cup.- MET  Pt will increase their (L) SF JAMES by 15-20 degrees for ease of cupping medications.- MET  Updated Goals 1/22/2025:   Pt will increase their (L) thumb JAMES to at least 100 degrees for ease of opening doors.  Pt will increase their (L) IF JAMES to at least 200 degrees for ease of manipulating buttons.  Pt will increase their (L) MF JAMES to at least 200  degrees for ease of holding steering wheel.  Pt will increase their (L) RF JAMES to at least 200  degrees for ease of holding cup.  Pt will increase their (L) SF JAMES to at least 200 degrees for ease of cupping medications.  Pt will increase their (L)  strength to at least 20# for ease of carying groceries.    Plan: follow up on strengthening exercises added to HEP  Date 12/31/2024   1/3/2025   1/6/2025  1/8/2025  1/14/2025   1/16/2025  1/22/2025   1/27/2025  1/30/2025   Visit # 2/8  3/8  4/8 5/8  6/8  7/8  8/8 9/16 10/16                        Evaluation                  Manual                                 PROM composite flexion 10x1 PROM composite flexion 10x1, 5 sec holds ECU pin and stretch 10x1   STM to dorsum of forearm and hand (8 mins)  STM to thenar eminence and web space (8 mins)   STM while passively ranging into composite flexion    Ther ex                                   Shoulder flexion 20x1    Composite flexion 20x1    2pt pinch velcro block removal and placement    Forearm sup/pro 10x2    Wrist flex/extend 10x2    Wrist RD/UD 10x2 Towel slides 10x2    Wrist flex/extend 10x2    Shoulder rolls 10x2    Shoulder external/internal rotation 20x1    Tendon glides 10x2    ExerStick wrist flex/extend (2 mins)    Thumb flex/extend 10x2    Thumb opposition to 3rd digit 10x1    Thumb palmar abd 10x2 Fluidotherapy with tendon glides (8 mins)    Composite flexion 10x2    Opposition 10x2    Joint blocking PIP and DIP flexion 10x2    Thumb flex/extend 10x2    Wrist flex/extend 10x2    Wrist RD/UD 10x2   Fluidotherapy with tendon glides (8 mins)    Tennis ball palmar abduction 10x2    Opposition 10x2    Joint blocking PIP and DIP flexion 10x2      Thumb flex/extend 10x2    Wrist flex/extend while holding dowel 10x2    RD/UD 10x2 with dowel    Dart throwers 10x2    ExerStick wrist flex/extend (2 mins x2) Tendon glides one position at a time 10x1    Wooden block manipulation:   -cupping  -alternating tip pinch  transportation  -thumb<>palm translation  (10 mins)    Composite digital flexion around red flexbar 10x3    ExerStick wrist flex/extend (2 mins x2)    Thumb flex/extend 10x2    Wrist flex/extend off ramp 30x1    Red web  -weight bearing extension presses 10x2 Tendon glides one position at a time 10x1    Alternating tip pinch marble transportation    2pt pinch peg placement onto board (34 pegs)    Lumbrical strengthening through use of clamp to flex Mps with PIP/DIP straight to grasp and transport cotton balls from table to container     Wrist flex/extend with composite digital flexion 10x2    Thumb flex/extend 10x2    Thumb joint blocking into flexion IP 10x2    Radial abduction of thumb 10x2    Palmar abduction Tendon glides one position at a time 10x1    PIP and DIP joint blocking flexion 10x2    Thumb flex/extend 10x2    Wrist flex/extend off ramp 30x1    Alternating tip pinch large peg placement onto board into cupping of two at a time Composite digital flexion into red web 10x1, 10 secs    Red web  10x2    Red web lumbrical squeeze 10x2    Dart throwers with tennis ball 10x2    Rubber band digital extension, abduction 10x2 Fluidotherapy with tendon glides (8 mins)    MCP lifts 10x2    Blue Therafoam Ex:  -  -lateral pinch  -3pt pinch  -alternating tip pinch  -thumb flexion  -composite tip pinch   10x2     HEP instruction                     Therapeutic Activity                                Sponge cupping and transportation     Large poker chip location and transportation [facility] Stacking 5 cones 4x1    Composite grasp cone slides of marbles 5x4 Alternating tip pinch pom-pom transportation (8 mins)     Functional cupping of marbles and transportation into container      Functional cupping of marbles and transportation into container (3-4 at a time)    Neuromuscular Re-education                                                   Modalities                              Moist heat 3 mins prior to exercises            Moist heat 3 mins prior to exercises       HEP:  Assignment date:   tendon glides, AROM: wrist flex/extend, RD/UD, forearm sup/pronation, shoulder flexion towel slides 12/19/24   Joint blocking at PIP 1/6/2025    Blue therafoam exercises 1/30/2025      Charges: TE 3   Total Timed Treatment: 40 min  Total Treatment Time: 40 min

## 2025-02-04 ENCOUNTER — OFFICE VISIT (OUTPATIENT)
Dept: OCCUPATIONAL MEDICINE | Facility: HOSPITAL | Age: 75
End: 2025-02-04
Attending: ORTHOPAEDIC SURGERY
Payer: MEDICARE

## 2025-02-04 PROCEDURE — 97110 THERAPEUTIC EXERCISES: CPT

## 2025-02-04 PROCEDURE — 97140 MANUAL THERAPY 1/> REGIONS: CPT

## 2025-02-04 NOTE — PROGRESS NOTES
Diagnosis:   Post left distal radius fracture, Orthopedic aftercare (Z47.89)       Referring Provider: Edil Ramos  Date of Evaluation:    12/19/2024    Precautions:  arthritis,HTN Next MD visit: none scheduled  Date of Surgery: n/a  Date of Injury: 09/18/24   Insurance Primary/Secondary: [employer] OUT OF STATE / N/A     # Auth Visits: 16 visits 12/19-4/24            Subjective: Pt states trying as much as she can to do her exercises. Some pain with exercises. Pain in the shoulder persist.     Pain: 3/10      Objective: See exercises flowsheet below for exercises and activities performed today.       Assessment: Fluidotherapy deferred today. PIP remains most challenging although PROM greater than AROM, demo'ing a strength deficit more than joint restrictions. Post exercises and PROM pt demo'd improved PIP. The best composite fist observed today by this therapist.        Goals: (16 visits)  Pt will be independent and compliant with comprehensive HEP to maintain progress achieved in OT-MET, updated  Pt will increase their (L) wrist flexion to at least 50 degrees for ease of dressing-PROGRESSING TOWARDS  Pt will increase their (L) wrist extension  to at least 50  degrees for ease bathing-PROGRESSING TOWARDS  Pt will increase their (L) forearm supination and pronation  to at least 60 degrees for opening cabinets/drawers.- MET  Pt will increase their (L) thumb JAMES by 15-20 degrees for ease of opening doors.- MET  Pt will increase their (L) IF JAMES by 15-20 degrees for ease of manipulating buttons.- MET  Pt will increase their (L) MF JAMES by 15-20 degrees for ease of holding steering wheel.- MET  Pt will increase their (L) RF JAMES by 15-20 degrees for ease of holding cup.- MET  Pt will increase their (L) SF JAMES by 15-20 degrees for ease of cupping medications.- MET  Updated Goals 1/22/2025:   Pt will increase their (L) thumb JAMES to at least 100 degrees for ease of opening doors.  Pt will increase their (L) IF JAMES to at  least 200 degrees for ease of manipulating buttons.  Pt will increase their (L) MF JAMES to at least 200 degrees for ease of holding steering wheel.  Pt will increase their (L) RF JAMES to at least 200  degrees for ease of holding cup.  Pt will increase their (L) SF JAMES to at least 200 degrees for ease of cupping medications.  Pt will increase their (L)  strength to at least 20# for ease of carying groceries.    Plan: Continue more NMRE for improved composite flexion  Date 12/31/2024   1/3/2025   1/6/2025  1/8/2025  1/14/2025   1/16/2025  1/22/2025   1/27/2025  1/30/2025 2/4/2025   Visit # 2/8  3/8  4/8 5/8  6/8  7/8  8/8 9/16 10/16 11/16                         Evaluation                   Manual                                  PROM composite flexion 10x1 PROM composite flexion 10x1, 5 sec holds ECU pin and stretch 10x1   STM to dorsum of forearm and hand (8 mins)  STM to thenar eminence and web space (8 mins)   STM while passively ranging into composite flexion  PROM of each digit into flexion then composite PROM    MCP and PIP joint mobs grade III    Ther ex                                    Shoulder flexion 20x1    Composite flexion 20x1    2pt pinch velcro block removal and placement    Forearm sup/pro 10x2    Wrist flex/extend 10x2    Wrist RD/UD 10x2 Towel slides 10x2    Wrist flex/extend 10x2    Shoulder rolls 10x2    Shoulder external/internal rotation 20x1    Tendon glides 10x2    ExerStick wrist flex/extend (2 mins)    Thumb flex/extend 10x2    Thumb opposition to 3rd digit 10x1    Thumb palmar abd 10x2 Fluidotherapy with tendon glides (8 mins)    Composite flexion 10x2    Opposition 10x2    Joint blocking PIP and DIP flexion 10x2    Thumb flex/extend 10x2    Wrist flex/extend 10x2    Wrist RD/UD 10x2   Fluidotherapy with tendon glides (8 mins)    Tennis ball palmar abduction 10x2    Opposition 10x2    Joint blocking PIP and DIP flexion 10x2      Thumb flex/extend 10x2    Wrist flex/extend while holding  dowel 10x2    RD/UD 10x2 with dowel    Dart throwers 10x2    ExerStick wrist flex/extend (2 mins x2) Tendon glides one position at a time 10x1    Wooden block manipulation:   -cupping  -alternating tip pinch transportation  -thumb<>palm translation  (10 mins)    Composite digital flexion around red flexbar 10x3    ExerStick wrist flex/extend (2 mins x2)    Thumb flex/extend 10x2    Wrist flex/extend off ramp 30x1    Red web  -weight bearing extension presses 10x2 Tendon glides one position at a time 10x1    Alternating tip pinch marble transportation    2pt pinch peg placement onto board (34 pegs)    Lumbrical strengthening through use of clamp to flex Mps with PIP/DIP straight to grasp and transport cotton balls from table to container     Wrist flex/extend with composite digital flexion 10x2    Thumb flex/extend 10x2    Thumb joint blocking into flexion IP 10x2    Radial abduction of thumb 10x2    Palmar abduction Tendon glides one position at a time 10x1    PIP and DIP joint blocking flexion 10x2    Thumb flex/extend 10x2    Wrist flex/extend off ramp 30x1    Alternating tip pinch large peg placement onto board into cupping of two at a time Composite digital flexion into red web 10x1, 10 secs    Red web  10x2    Red web lumbrical squeeze 10x2    Dart throwers with tennis ball 10x2    Rubber band digital extension, abduction 10x2 Fluidotherapy with tendon glides (8 mins)    MCP lifts 10x2    Blue Therafoam Ex:  -  -lateral pinch  -3pt pinch  -alternating tip pinch  -thumb flexion  -composite tip pinch   10x2     Thumb flex/extend 10x2    1# wrist flex/extend 5x4    Red web  -weight bearing extension presses  -  -lateral pinch  10x2    Yellow putty ex:   -opposition  -adduction  -MCP flexion presses  (10 minutes)    PIP flexion joint blocking 15x1    Hook touches with large wooden dowel  15x2       HEP instruction                      Therapeutic Activity                                 Sponge cupping  and transportation     Large poker chip location and transportation [facility] Stacking 5 cones 4x1    Composite grasp cone slides of marbles 5x4 Alternating tip pinch pom-pom transportation (8 mins)     Functional cupping of marbles and transportation into container      Functional cupping of marbles and transportation into container (3-4 at a time)     Neuromuscular Re-education                                                  Contract relax composite flexion with isometric against therapist resistance 10x1   Modalities                               Moist heat 3 mins prior to exercises           Moist heat 3 mins prior to exercises  Moist heat 3 mins post to exercises      HEP:  Assignment date:   tendon glides, AROM: wrist flex/extend, RD/UD, forearm sup/pronation, shoulder flexion towel slides 12/19/24   Joint blocking at PIP 1/6/2025    Blue therafoam exercises 1/30/2025      Charges: MT 1 (12), TE 2 (30)    Total Timed Treatment: 42 min  Total Treatment Time: 45 min

## 2025-02-06 ENCOUNTER — OFFICE VISIT (OUTPATIENT)
Dept: OCCUPATIONAL MEDICINE | Facility: HOSPITAL | Age: 75
End: 2025-02-06
Attending: ORTHOPAEDIC SURGERY
Payer: MEDICARE

## 2025-02-06 PROCEDURE — 97112 NEUROMUSCULAR REEDUCATION: CPT

## 2025-02-06 PROCEDURE — 97110 THERAPEUTIC EXERCISES: CPT

## 2025-02-06 NOTE — PROGRESS NOTES
Diagnosis:   Post left distal radius fracture, Orthopedic aftercare (Z47.89)       Referring Provider: Edil Ramos  Date of Evaluation:    12/19/2024    Precautions:  arthritis,HTN Next MD visit: none scheduled  Date of Surgery: n/a  Date of Injury: 09/18/24   Insurance Primary/Secondary: [employer] OUT OF STATE / N/A     # Auth Visits: 16 visits 12/19-4/24            Subjective: Pt states continued compliance with her HEP.    Pain: 3/10      Objective: See exercises flowsheet below for exercises and activities performed today.       Assessment: Fluidotherapy with AROM performed at beginning of session for tissue lengthening and extensibility. Pt able to achieve more range post NMRE contract/relax exercises. Pt also able to grasp and cup 5 marbles today without dropping them unlike previously only able to grasp 3-4.       Goals: (16 visits)  Pt will be independent and compliant with comprehensive HEP to maintain progress achieved in OT-MET, updated  Pt will increase their (L) wrist flexion to at least 50 degrees for ease of dressing-PROGRESSING TOWARDS  Pt will increase their (L) wrist extension  to at least 50  degrees for ease bathing-PROGRESSING TOWARDS  Pt will increase their (L) forearm supination and pronation  to at least 60 degrees for opening cabinets/drawers.- MET  Pt will increase their (L) thumb JAMES by 15-20 degrees for ease of opening doors.- MET  Pt will increase their (L) IF JAMES by 15-20 degrees for ease of manipulating buttons.- MET  Pt will increase their (L) MF JAMES by 15-20 degrees for ease of holding steering wheel.- MET  Pt will increase their (L) RF JAMES by 15-20 degrees for ease of holding cup.- MET  Pt will increase their (L) SF JAMES by 15-20 degrees for ease of cupping medications.- MET  Updated Goals 1/22/2025:   Pt will increase their (L) thumb JAMES to at least 100 degrees for ease of opening doors.  Pt will increase their (L) IF JAMES to at least 200 degrees for ease of manipulating  buttons.  Pt will increase their (L) MF JAMES to at least 200 degrees for ease of holding steering wheel.  Pt will increase their (L) RF JAMES to at least 200  degrees for ease of holding cup.  Pt will increase their (L) SF JAMES to at least 200 degrees for ease of cupping medications.  Pt will increase their (L)  strength to at least 20# for ease of carying groceries.    Plan: Continue more NMRE for improved composite flexion,fludiotherapy, and strengthening into composite flexion  Date 12/31/2024   1/3/2025   1/6/2025  1/8/2025  1/14/2025   1/16/2025  1/22/2025   1/27/2025  1/30/2025 2/4/2025 2/6/2025   Visit # 2/8  3/8  4/8 5/8  6/8  7/8  8/8 9/16 10/16 11/16 12/16                          Evaluation                    Manual                                   PROM composite flexion 10x1 PROM composite flexion 10x1, 5 sec holds ECU pin and stretch 10x1   STM to dorsum of forearm and hand (8 mins)  STM to thenar eminence and web space (8 mins)   STM while passively ranging into composite flexion  PROM of each digit into flexion then composite PROM    MCP and PIP joint mobs grade III  PROM of each digit into flexion then composite PROM   Ther ex                                     Shoulder flexion 20x1    Composite flexion 20x1    2pt pinch velcro block removal and placement    Forearm sup/pro 10x2    Wrist flex/extend 10x2    Wrist RD/UD 10x2 Towel slides 10x2    Wrist flex/extend 10x2    Shoulder rolls 10x2    Shoulder external/internal rotation 20x1    Tendon glides 10x2    ExerStick wrist flex/extend (2 mins)    Thumb flex/extend 10x2    Thumb opposition to 3rd digit 10x1    Thumb palmar abd 10x2 Fluidotherapy with tendon glides (8 mins)    Composite flexion 10x2    Opposition 10x2    Joint blocking PIP and DIP flexion 10x2    Thumb flex/extend 10x2    Wrist flex/extend 10x2    Wrist RD/UD 10x2   Fluidotherapy with tendon glides (8 mins)    Tennis ball palmar abduction 10x2    Opposition 10x2    Joint blocking PIP  and DIP flexion 10x2      Thumb flex/extend 10x2    Wrist flex/extend while holding dowel 10x2    RD/UD 10x2 with dowel    Dart throwers 10x2    ExerStick wrist flex/extend (2 mins x2) Tendon glides one position at a time 10x1    Wooden block manipulation:   -cupping  -alternating tip pinch transportation  -thumb<>palm translation  (10 mins)    Composite digital flexion around red flexbar 10x3    ExerStick wrist flex/extend (2 mins x2)    Thumb flex/extend 10x2    Wrist flex/extend off ramp 30x1    Red web  -weight bearing extension presses 10x2 Tendon glides one position at a time 10x1    Alternating tip pinch marble transportation    2pt pinch peg placement onto board (34 pegs)    Lumbrical strengthening through use of clamp to flex Mps with PIP/DIP straight to grasp and transport cotton balls from table to container     Wrist flex/extend with composite digital flexion 10x2    Thumb flex/extend 10x2    Thumb joint blocking into flexion IP 10x2    Radial abduction of thumb 10x2    Palmar abduction Tendon glides one position at a time 10x1    PIP and DIP joint blocking flexion 10x2    Thumb flex/extend 10x2    Wrist flex/extend off ramp 30x1    Alternating tip pinch large peg placement onto board into cupping of two at a time Composite digital flexion into red web 10x1, 10 secs    Red web  10x2    Red web lumbrical squeeze 10x2    Dart throwers with tennis ball 10x2    Rubber band digital extension, abduction 10x2 Fluidotherapy with tendon glides (8 mins)    MCP lifts 10x2    Blue Therafoam Ex:  -  -lateral pinch  -3pt pinch  -alternating tip pinch  -thumb flexion  -composite tip pinch   10x2     Thumb flex/extend 10x2    1# wrist flex/extend 5x4    Red web  -weight bearing extension presses  -  -lateral pinch  10x2    Yellow putty ex:   -opposition  -adduction  -MCP flexion presses  (10 minutes)    PIP flexion joint blocking 15x1    Hook touches with large wooden dowel  15x2     Fluidotherapy with  tendon glides (8 mins)    PIP flexion joint blocking 15x1    Hook touches with large wooden dowel  20x2    Red web  -composite tip   lateral pinch  10x2           HEP instruction                       Therapeutic Activity                                  Sponge cupping and transportation     Large poker chip location and transportation [facility] Stacking 5 cones 4x1    Composite grasp cone slides of marbles 5x4 Alternating tip pinch pom-pom transportation (8 mins)     Functional cupping of marbles and transportation into container      Functional cupping of marbles and transportation into container (3-4 at a time)   Functional cupping of marbles and transportation into container (5 at a time)   Neuromuscular Re-education                                                   Contract relax composite flexion with isometric against therapist resistance 10x1 Contract relax composite flexion with isometric against therapist resistance 10x2   Modalities                                Moist heat 3 mins prior to exercises           Moist heat 3 mins prior to exercises  Moist heat 3 mins post to exercises       HEP:  Assignment date:   tendon glides, AROM: wrist flex/extend, RD/UD, forearm sup/pronation, shoulder flexion towel slides 12/19/24   Joint blocking at PIP 1/6/2025    Blue therafoam exercises 1/30/2025      Charges: NMRE 1 (8), TE 2 (35)  Total Timed Treatment: 43 min  Total Treatment Time: 43 min

## 2025-02-10 ENCOUNTER — OFFICE VISIT (OUTPATIENT)
Dept: OCCUPATIONAL MEDICINE | Facility: HOSPITAL | Age: 75
End: 2025-02-10
Attending: ORTHOPAEDIC SURGERY
Payer: MEDICARE

## 2025-02-10 PROCEDURE — 97110 THERAPEUTIC EXERCISES: CPT

## 2025-02-10 NOTE — PROGRESS NOTES
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    Pain: 3/10      Objective: See exercises flowsheet below for exercises and activities performed today.       Assessment: Fluidotherapy with AROM continued for tissue lengthening and extensibility. Pt continues to demo more PIP and DIP flexion each session. Pt better able to make composite fist at about 60%. Pt challenged with manipulation of small poker chips which resulted in more difficulty cupping hand.       Goals: (16 visits)  Pt will be independent and compliant with comprehensive HEP to maintain progress achieved in OT-MET, updated  Pt will increase their (L) wrist flexion to at least 50 degrees for ease of dressing-PROGRESSING TOWARDS  Pt will increase their (L) wrist extension  to at least 50  degrees for ease bathing-PROGRESSING TOWARDS  Pt will increase their (L) forearm supination and pronation  to at least 60 degrees for opening cabinets/drawers.- MET  Pt will increase their (L) thumb JAMES by 15-20 degrees for ease of opening doors.- MET  Pt will increase their (L) IF JAMES by 15-20 degrees for ease of manipulating buttons.- MET  Pt will increase their (L) MF JAMES by 15-20 degrees for ease of holding steering wheel.- MET  Pt will increase their (L) RF JAMES by 15-20 degrees for ease of holding cup.- MET  Pt will increase their (L) SF JAMES by 15-20 degrees for ease of cupping medications.- MET  Updated Goals 1/22/2025:   Pt will increase their (L) thumb JAMES to at least 100 degrees for ease of opening doors.  Pt will increase their (L) IF JAMES to at least 200 degrees for ease of manipulating  buttons.  Pt will increase their (L) MF JAMES to at least 200 degrees for ease of holding steering wheel.  Pt will increase their (L) RF JAMES to at least 200  degrees for ease of holding cup.  Pt will increase their (L) SF JAMES to at least 200 degrees for ease of cupping medications.  Pt will increase their (L)  strength to at least 20# for ease of carying groceries.    Plan: Continue cupping with poker chips, and NMRE for increased flexion.  Date 12/31/2024   1/3/2025   1/6/2025  1/8/2025  1/14/2025   1/16/2025  1/22/2025   1/27/2025  1/30/2025 2/4/2025 2/6/2025 2/10/2025    Visit # 2/8  3/8  4/8 5/8  6/8  7/8  8/8 9/16 10/16 11/16 12/16 13/16                           Evaluation                     Manual                                    PROM composite flexion 10x1 PROM composite flexion 10x1, 5 sec holds ECU pin and stretch 10x1   STM to dorsum of forearm and hand (8 mins)  STM to thenar eminence and web space (8 mins)   STM while passively ranging into composite flexion  PROM of each digit into flexion then composite PROM    MCP and PIP joint mobs grade III  PROM of each digit into flexion then composite PROM    Ther ex                                      Shoulder flexion 20x1    Composite flexion 20x1    2pt pinch velcro block removal and placement    Forearm sup/pro 10x2    Wrist flex/extend 10x2    Wrist RD/UD 10x2 Towel slides 10x2    Wrist flex/extend 10x2    Shoulder rolls 10x2    Shoulder external/internal rotation 20x1    Tendon glides 10x2    ExerStick wrist flex/extend (2 mins)    Thumb flex/extend 10x2    Thumb opposition to 3rd digit 10x1    Thumb palmar abd 10x2 Fluidotherapy with tendon glides (8 mins)    Composite flexion 10x2    Opposition 10x2    Joint blocking PIP and DIP flexion 10x2    Thumb flex/extend 10x2    Wrist flex/extend 10x2    Wrist RD/UD 10x2   Fluidotherapy with tendon glides (8 mins)    Tennis ball palmar abduction 10x2    Opposition 10x2    Joint blocking PIP and DIP flexion  10x2      Thumb flex/extend 10x2    Wrist flex/extend while holding dowel 10x2    RD/UD 10x2 with dowel    Dart throwers 10x2    ExerStick wrist flex/extend (2 mins x2) Tendon glides one position at a time 10x1    Wooden block manipulation:   -cupping  -alternating tip pinch transportation  -thumb<>palm translation  (10 mins)    Composite digital flexion around red flexbar 10x3    ExerStick wrist flex/extend (2 mins x2)    Thumb flex/extend 10x2    Wrist flex/extend off ramp 30x1    Red web  -weight bearing extension presses 10x2 Tendon glides one position at a time 10x1    Alternating tip pinch marble transportation    2pt pinch peg placement onto board (34 pegs)    Lumbrical strengthening through use of clamp to flex Mps with PIP/DIP straight to grasp and transport cotton balls from table to container     Wrist flex/extend with composite digital flexion 10x2    Thumb flex/extend 10x2    Thumb joint blocking into flexion IP 10x2    Radial abduction of thumb 10x2    Palmar abduction Tendon glides one position at a time 10x1    PIP and DIP joint blocking flexion 10x2    Thumb flex/extend 10x2    Wrist flex/extend off ramp 30x1    Alternating tip pinch large peg placement onto board into cupping of two at a time Composite digital flexion into red web 10x1, 10 secs    Red web  10x2    Red web lumbrical squeeze 10x2    Dart throwers with tennis ball 10x2    Rubber band digital extension, abduction 10x2 Fluidotherapy with tendon glides (8 mins)    MCP lifts 10x2    Blue Therafoam Ex:  -  -lateral pinch  -3pt pinch  -alternating tip pinch  -thumb flexion  -composite tip pinch   10x2     Thumb flex/extend 10x2    1# wrist flex/extend 5x4    Red web  -weight bearing extension presses  -  -lateral pinch  10x2    Yellow putty ex:   -opposition  -adduction  -MCP flexion presses  (10 minutes)    PIP flexion joint blocking 15x1    Hook touches with large wooden dowel  15x2     Fluidotherapy with tendon glides (8  mins)    PIP flexion joint blocking 15x1    Hook touches with large wooden dowel  20x2    Red web  -composite tip   lateral pinch  10x2         Fluidotherapy with tendon glides (8 mins)    Composite flexion 20x1    PIP flexion joint blocking 20x1    Hook touches with large wooden dowel  20x2    Yellow Putty Ex:   - composite flexion cone presses 10x2  -opposition 30x1  -straight fist 10x2   HEP instruction                        Therapeutic Activity                                   Sponge cupping and transportation     Large poker chip location and transportation [facility] Stacking 5 cones 4x1    Composite grasp cone slides of marbles 5x4 Alternating tip pinch pom-pom transportation (8 mins)     Functional cupping of marbles and transportation into container      Functional cupping of marbles and transportation into container (3-4 at a time)   Functional cupping of marbles and transportation into container (5 at a time) Functional cupping of  and transportation into container (5 at a time)   Neuromuscular Re-education                                                    Contract relax composite flexion with isometric against therapist resistance 10x1 Contract relax composite flexion with isometric against therapist resistance 10x2 Contract relax composite flexion with isometric holds against therapist resistance 10x2   Modalities                                 Moist heat 3 mins prior to exercises           Moist heat 3 mins prior to exercises  Moist heat 3 mins post to exercises        HEP:  Assignment date:   tendon glides, AROM: wrist flex/extend, RD/UD, forearm sup/pronation, shoulder flexion towel slides 12/19/24   Joint blocking at PIP 1/6/2025    Blue therafoam exercises 1/30/2025      Charges: TE 3  Total Timed Treatment: 45 min  Total Treatment Time: 45 min

## 2025-02-19 ENCOUNTER — APPOINTMENT (OUTPATIENT)
Dept: OCCUPATIONAL MEDICINE | Facility: HOSPITAL | Age: 75
End: 2025-02-19
Attending: ORTHOPAEDIC SURGERY
Payer: MEDICARE

## 2025-02-26 ENCOUNTER — APPOINTMENT (OUTPATIENT)
Dept: OCCUPATIONAL MEDICINE | Facility: HOSPITAL | Age: 75
End: 2025-02-26
Attending: ORTHOPAEDIC SURGERY
Payer: MEDICARE

## 2025-03-03 ENCOUNTER — OFFICE VISIT (OUTPATIENT)
Dept: OCCUPATIONAL MEDICINE | Facility: HOSPITAL | Age: 75
End: 2025-03-03
Attending: ORTHOPAEDIC SURGERY
Payer: MEDICARE

## 2025-03-03 PROCEDURE — 97110 THERAPEUTIC EXERCISES: CPT

## 2025-03-03 PROCEDURE — 97140 MANUAL THERAPY 1/> REGIONS: CPT

## 2025-03-03 NOTE — PROGRESS NOTES
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    Pain: 3/10      Objective: See exercises flowsheet below for exercises and activities performed today.       Assessment: Pt demo'd good  strengrth. Pt able to tolerate strengthening however rest breaks needed d/t rapid fatigue    Goals: (16 visits)  Pt will be independent and compliant with comprehensive HEP to maintain progress achieved in OT-MET, updated  Pt will increase their (L) wrist flexion to at least 50 degrees for ease of dressing-PROGRESSING TOWARDS  Pt will increase their (L) wrist extension  to at least 50  degrees for ease bathing-PROGRESSING TOWARDS  Pt will increase their (L) forearm supination and pronation  to at least 60 degrees for opening cabinets/drawers.- MET  Pt will increase their (L) thumb JAMES by 15-20 degrees for ease of opening doors.- MET  Pt will increase their (L) IF JAMES by 15-20 degrees for ease of manipulating buttons.- MET  Pt will increase their (L) MF JAMES by 15-20 degrees for ease of holding steering wheel.- MET  Pt will increase their (L) RF JAMES by 15-20 degrees for ease of holding cup.- MET  Pt will increase their (L) SF JAMES by 15-20 degrees for ease of cupping medications.- MET  Updated Goals 1/22/2025:   Pt will increase their (L) thumb JAMES to at least 100 degrees for ease of opening doors.  Pt will increase their (L) IF JAMES to at least 200 degrees for ease of manipulating buttons.  Pt will increase their (L) MF JAMES to at least 200 degrees for ease of holding steering wheel.  Pt will increase their (L) RF JAMES to at least 200  degrees for ease of holding  cup.  Pt will increase their (L) SF JAMES to at least 200 degrees for ease of cupping medications.  Pt will increase their (L)  strength to at least 20# for ease of carying groceries.    Plan: Continue light strengthening  Date 12/31/2024   1/3/2025   1/6/2025  1/8/2025  1/14/2025   1/16/2025  1/22/2025   1/27/2025  1/30/2025 2/4/2025 2/6/2025 2/10/2025  3/3/2025   Visit # 2/8  3/8  4/8 5/8  6/8  7/8  8/8 9/16 10/16 11/16 12/16 13/16 14/16                            Evaluation                      Manual                                     PROM composite flexion 10x1 PROM composite flexion 10x1, 5 sec holds ECU pin and stretch 10x1   STM to dorsum of forearm and hand (8 mins)  STM to thenar eminence and web space (8 mins)   STM while passively ranging into composite flexion  PROM of each digit into flexion then composite PROM    MCP and PIP joint mobs grade III  PROM of each digit into flexion then composite PROM  IASTM to forearm extensors    Ther ex                                       Shoulder flexion 20x1    Composite flexion 20x1    2pt pinch velcro block removal and placement    Forearm sup/pro 10x2    Wrist flex/extend 10x2    Wrist RD/UD 10x2 Towel slides 10x2    Wrist flex/extend 10x2    Shoulder rolls 10x2    Shoulder external/internal rotation 20x1    Tendon glides 10x2    ExerStick wrist flex/extend (2 mins)    Thumb flex/extend 10x2    Thumb opposition to 3rd digit 10x1    Thumb palmar abd 10x2 Fluidotherapy with tendon glides (8 mins)    Composite flexion 10x2    Opposition 10x2    Joint blocking PIP and DIP flexion 10x2    Thumb flex/extend 10x2    Wrist flex/extend 10x2    Wrist RD/UD 10x2   Fluidotherapy with tendon glides (8 mins)    Tennis ball palmar abduction 10x2    Opposition 10x2    Joint blocking PIP and DIP flexion 10x2      Thumb flex/extend 10x2    Wrist flex/extend while holding dowel 10x2    RD/UD 10x2 with dowel    Dart throwers 10x2    ExerStick wrist flex/extend (2 mins x2) Tendon  glides one position at a time 10x1    Wooden block manipulation:   -cupping  -alternating tip pinch transportation  -thumb<>palm translation  (10 mins)    Composite digital flexion around red flexbar 10x3    ExerStick wrist flex/extend (2 mins x2)    Thumb flex/extend 10x2    Wrist flex/extend off ramp 30x1    Red web  -weight bearing extension presses 10x2 Tendon glides one position at a time 10x1    Alternating tip pinch marble transportation    2pt pinch peg placement onto board (34 pegs)    Lumbrical strengthening through use of clamp to flex Mps with PIP/DIP straight to grasp and transport cotton balls from table to container     Wrist flex/extend with composite digital flexion 10x2    Thumb flex/extend 10x2    Thumb joint blocking into flexion IP 10x2    Radial abduction of thumb 10x2    Palmar abduction Tendon glides one position at a time 10x1    PIP and DIP joint blocking flexion 10x2    Thumb flex/extend 10x2    Wrist flex/extend off ramp 30x1    Alternating tip pinch large peg placement onto board into cupping of two at a time Composite digital flexion into red web 10x1, 10 secs    Red web  10x2    Red web lumbrical squeeze 10x2    Dart throwers with tennis ball 10x2    Rubber band digital extension, abduction 10x2 Fluidotherapy with tendon glides (8 mins)    MCP lifts 10x2    Blue Therafoam Ex:  -  -lateral pinch  -3pt pinch  -alternating tip pinch  -thumb flexion  -composite tip pinch   10x2     Thumb flex/extend 10x2    1# wrist flex/extend 5x4    Red web  -weight bearing extension presses  -  -lateral pinch  10x2    Yellow putty ex:   -opposition  -adduction  -MCP flexion presses  (10 minutes)    PIP flexion joint blocking 15x1    Hook touches with large wooden dowel  15x2     Fluidotherapy with tendon glides (8 mins)    PIP flexion joint blocking 15x1    Hook touches with large wooden dowel  20x2    Red web  -composite tip   lateral pinch  10x2         Fluidotherapy with tendon glides  (8 mins)    Composite flexion 20x1    PIP flexion joint blocking 20x1    Hook touches with large wooden dowel  20x2    Yellow Putty Ex:   - composite flexion cone presses 10x2  -opposition 30x1  -straight fist 10x2 Composite flexion 20x1    Red web  10x2    3pt with green pin removal and placement of velcro blocks     Yellow flexbar 6# bilateral sup/pro 10x2  -\"T\" bends    Hook curls 10x2   HEP instruction                         Therapeutic Activity                                    Sponge cupping and transportation     Large poker chip location and transportation [facility] Stacking 5 cones 4x1    Composite grasp cone slides of marbles 5x4 Alternating tip pinch pom-pom transportation (8 mins)     Functional cupping of marbles and transportation into container      Functional cupping of marbles and transportation into container (3-4 at a time)   Functional cupping of marbles and transportation into container (5 at a time) Functional cupping of  and transportation into container (5 at a time) Functional cupping of  and transportation of poker chips into container (5 at a time)   Neuromuscular Re-education                                                     Contract relax composite flexion with isometric against therapist resistance 10x1 Contract relax composite flexion with isometric against therapist resistance 10x2 Contract relax composite flexion with isometric holds against therapist resistance 10x2 Contract relax composite flexion with isometric holds against therapist resistance 10x2   Modalities                                  Moist heat 3 mins prior to exercises           Moist heat 3 mins prior to exercises  Moist heat 3 mins post to exercises         HEP:  Assignment date:   tendon glides, AROM: wrist flex/extend, RD/UD, forearm sup/pronation, shoulder flexion towel slides 12/19/24   Joint blocking at PIP 1/6/2025    Blue therafoam exercises 1/30/2025      Charges: TE 2, MT 1 (10)  Total Timed Treatment:  40 min  Total Treatment Time: 40 min

## 2025-03-05 ENCOUNTER — OFFICE VISIT (OUTPATIENT)
Dept: OCCUPATIONAL MEDICINE | Facility: HOSPITAL | Age: 75
End: 2025-03-05
Attending: ORTHOPAEDIC SURGERY
Payer: MEDICARE

## 2025-03-05 PROCEDURE — 97110 THERAPEUTIC EXERCISES: CPT

## 2025-03-05 NOTE — PROGRESS NOTES
Diagnosis:   Post left distal radius fracture, Orthopedic aftercare (Z47.89)       Referring Provider: Edil Ramos  Date of Evaluation:    12/19/2024    Precautions:  arthritis,HTN Next MD visit: none scheduled  Date of Surgery: n/a  Date of Injury: 09/18/24   Insurance Primary/Secondary: [employer] OUT OF STATE / N/A     # Auth Visits: 16 visits 12/19-4/24            Subjective: Pt states more pain d/t the cold.    Pain: 3/10      Objective: See exercises flowsheet below for exercises and activities performed today.       Assessment: Pt demo'd the best composite flexion during functional activities this OT has seen. Pt able to grasp marbles and hold onto flexbar with more precision than prior sessions.    Goals: (16 visits)  Pt will be independent and compliant with comprehensive HEP to maintain progress achieved in OT-MET, updated  Pt will increase their (L) wrist flexion to at least 50 degrees for ease of dressing-PROGRESSING TOWARDS  Pt will increase their (L) wrist extension  to at least 50  degrees for ease bathing-PROGRESSING TOWARDS  Pt will increase their (L) forearm supination and pronation  to at least 60 degrees for opening cabinets/drawers.- MET  Pt will increase their (L) thumb JAMES by 15-20 degrees for ease of opening doors.- MET  Pt will increase their (L) IF JAMES by 15-20 degrees for ease of manipulating buttons.- MET  Pt will increase their (L) MF JAMES by 15-20 degrees for ease of holding steering wheel.- MET  Pt will increase their (L) RF JAMES by 15-20 degrees for ease of holding cup.- MET  Pt will increase their (L) SF JAMES by 15-20 degrees for ease of cupping medications.- MET  Updated Goals 1/22/2025:   Pt will increase their (L) thumb JAMES to at least 100 degrees for ease of opening doors.  Pt will increase their (L) IF JAMES to at least 200 degrees for ease of manipulating buttons.  Pt will increase their (L) MF JAMES to at least 200 degrees for ease of holding steering wheel.  Pt will increase  their (L) RF JAMES to at least 200  degrees for ease of holding cup.  Pt will increase their (L) SF JAMES to at least 200 degrees for ease of cupping medications.  Pt will increase their (L)  strength to at least 20# for ease of carying groceries.    Plan: Progress note next session  Date 12/31/2024   1/3/2025   1/6/2025  1/8/2025  1/14/2025   1/16/2025  1/22/2025   1/27/2025  1/30/2025 2/4/2025 2/6/2025 2/10/2025  3/3/2025 3/5/2025   Visit # 2/8  3/8  4/8 5/8  6/8  7/8  8/8 9/16 10/16 11/16 12/16 13/16 14/16 15/16                             Evaluation                       Manual                                      PROM composite flexion 10x1 PROM composite flexion 10x1, 5 sec holds ECU pin and stretch 10x1   STM to dorsum of forearm and hand (8 mins)  STM to thenar eminence and web space (8 mins)   STM while passively ranging into composite flexion  PROM of each digit into flexion then composite PROM    MCP and PIP joint mobs grade III  PROM of each digit into flexion then composite PROM  IASTM to forearm extensors     Ther ex                                        Shoulder flexion 20x1    Composite flexion 20x1    2pt pinch velcro block removal and placement    Forearm sup/pro 10x2    Wrist flex/extend 10x2    Wrist RD/UD 10x2 Towel slides 10x2    Wrist flex/extend 10x2    Shoulder rolls 10x2    Shoulder external/internal rotation 20x1    Tendon glides 10x2    ExerStick wrist flex/extend (2 mins)    Thumb flex/extend 10x2    Thumb opposition to 3rd digit 10x1    Thumb palmar abd 10x2 Fluidotherapy with tendon glides (8 mins)    Composite flexion 10x2    Opposition 10x2    Joint blocking PIP and DIP flexion 10x2    Thumb flex/extend 10x2    Wrist flex/extend 10x2    Wrist RD/UD 10x2   Fluidotherapy with tendon glides (8 mins)    Tennis ball palmar abduction 10x2    Opposition 10x2    Joint blocking PIP and DIP flexion 10x2      Thumb flex/extend 10x2    Wrist flex/extend while holding dowel 10x2    RD/UD 10x2  with dowel    Dart throwers 10x2    ExerStick wrist flex/extend (2 mins x2) Tendon glides one position at a time 10x1    Wooden block manipulation:   -cupping  -alternating tip pinch transportation  -thumb<>palm translation  (10 mins)    Composite digital flexion around red flexbar 10x3    ExerStick wrist flex/extend (2 mins x2)    Thumb flex/extend 10x2    Wrist flex/extend off ramp 30x1    Red web  -weight bearing extension presses 10x2 Tendon glides one position at a time 10x1    Alternating tip pinch marble transportation    2pt pinch peg placement onto board (34 pegs)    Lumbrical strengthening through use of clamp to flex Mps with PIP/DIP straight to grasp and transport cotton balls from table to container     Wrist flex/extend with composite digital flexion 10x2    Thumb flex/extend 10x2    Thumb joint blocking into flexion IP 10x2    Radial abduction of thumb 10x2    Palmar abduction Tendon glides one position at a time 10x1    PIP and DIP joint blocking flexion 10x2    Thumb flex/extend 10x2    Wrist flex/extend off ramp 30x1    Alternating tip pinch large peg placement onto board into cupping of two at a time Composite digital flexion into red web 10x1, 10 secs    Red web  10x2    Red web lumbrical squeeze 10x2    Dart throwers with tennis ball 10x2    Rubber band digital extension, abduction 10x2 Fluidotherapy with tendon glides (8 mins)    MCP lifts 10x2    Blue Therafoam Ex:  -  -lateral pinch  -3pt pinch  -alternating tip pinch  -thumb flexion  -composite tip pinch   10x2     Thumb flex/extend 10x2    1# wrist flex/extend 5x4    Red web  -weight bearing extension presses  -  -lateral pinch  10x2    Yellow putty ex:   -opposition  -adduction  -MCP flexion presses  (10 minutes)    PIP flexion joint blocking 15x1    Hook touches with large wooden dowel  15x2     Fluidotherapy with tendon glides (8 mins)    PIP flexion joint blocking 15x1    Hook touches with large wooden dowel  20x2    Red  web  -composite tip   lateral pinch  10x2         Fluidotherapy with tendon glides (8 mins)    Composite flexion 20x1    PIP flexion joint blocking 20x1    Hook touches with large wooden dowel  20x2    Yellow Putty Ex:   - composite flexion cone presses 10x2  -opposition 30x1  -straight fist 10x2 Composite flexion 20x1    Red web  10x2    3pt with green pin removal and placement of velcro blocks     Yellow flexbar 6# bilateral sup/pro 10x2  -\"T\" bends    Hook curls 10x2 Fluidotherapy with tendon glides (8 mins)    Red web  10x2    1.5 Digiflex iso digital flexion 10x2    1.5# digiflex composite flexion 10x2    Yellow flexbar 6# bilateral sup/pro 10x2      Yellow Putty Ex:   -FDP glides  -straight fist squeezes FDS glide  -tip pinch   HEP instruction                          Therapeutic Activity                                     Sponge cupping and transportation     Large poker chip location and transportation [facility] Stacking 5 cones 4x1    Composite grasp cone slides of marbles 5x4 Alternating tip pinch pom-pom transportation (8 mins)     Functional cupping of marbles and transportation into container      Functional cupping of marbles and transportation into container (3-4 at a time)   Functional cupping of marbles and transportation into container (5 at a time) Functional cupping of  and transportation into container (5 at a time) Functional cupping of  and transportation of poker chips into container (5 at a time) Functional cupping and transportation of marbles from sensory bin (5 mins)   Neuromuscular Re-education                                                      Contract relax composite flexion with isometric against therapist resistance 10x1 Contract relax composite flexion with isometric against therapist resistance 10x2 Contract relax composite flexion with isometric holds against therapist resistance 10x2 Contract relax composite flexion with isometric holds against therapist resistance 10x2     Modalities                                   Moist heat 3 mins prior to exercises           Moist heat 3 mins prior to exercises  Moist heat 3 mins post to exercises          HEP:  Assignment date:   tendon glides, AROM: wrist flex/extend, RD/UD, forearm sup/pronation, shoulder flexion towel slides 12/19/24   Joint blocking at PIP 1/6/2025    Blue therafoam exercises 1/30/2025      Charges: TE3  Total Timed Treatment: 40 min  Total Treatment Time: 40 min

## 2025-03-11 ENCOUNTER — LAB ENCOUNTER (OUTPATIENT)
Dept: LAB | Age: 75
End: 2025-03-11
Attending: INTERNAL MEDICINE
Payer: MEDICARE

## 2025-03-11 ENCOUNTER — OFFICE VISIT (OUTPATIENT)
Dept: INTERNAL MEDICINE CLINIC | Facility: CLINIC | Age: 75
End: 2025-03-11
Payer: MEDICARE

## 2025-03-11 VITALS
TEMPERATURE: 98 F | HEIGHT: 66 IN | DIASTOLIC BLOOD PRESSURE: 81 MMHG | SYSTOLIC BLOOD PRESSURE: 137 MMHG | BODY MASS INDEX: 27 KG/M2 | HEART RATE: 89 BPM

## 2025-03-11 DIAGNOSIS — I10 PRIMARY HYPERTENSION: Primary | ICD-10-CM

## 2025-03-11 DIAGNOSIS — E78.5 HYPERLIPIDEMIA, UNSPECIFIED HYPERLIPIDEMIA TYPE: ICD-10-CM

## 2025-03-11 DIAGNOSIS — Z78.0 POSTMENOPAUSAL: ICD-10-CM

## 2025-03-11 DIAGNOSIS — Z12.31 VISIT FOR SCREENING MAMMOGRAM: ICD-10-CM

## 2025-03-11 DIAGNOSIS — Z00.00 MEDICARE ANNUAL WELLNESS VISIT, SUBSEQUENT: Primary | ICD-10-CM

## 2025-03-11 DIAGNOSIS — M81.0 AGE-RELATED OSTEOPOROSIS WITHOUT CURRENT PATHOLOGICAL FRACTURE: ICD-10-CM

## 2025-03-11 DIAGNOSIS — E04.9 GOITER: ICD-10-CM

## 2025-03-11 DIAGNOSIS — I10 PRIMARY HYPERTENSION: ICD-10-CM

## 2025-03-11 PROBLEM — Z47.89 ORTHOPEDIC AFTERCARE: Status: RESOLVED | Noted: 2024-10-28 | Resolved: 2025-03-11

## 2025-03-11 LAB
ALBUMIN SERPL-MCNC: 4.7 G/DL (ref 3.2–4.8)
ALBUMIN/GLOB SERPL: 1.7 {RATIO} (ref 1–2)
ALP LIVER SERPL-CCNC: 91 U/L
ALT SERPL-CCNC: 19 U/L
ANION GAP SERPL CALC-SCNC: 7 MMOL/L (ref 0–18)
AST SERPL-CCNC: 21 U/L (ref ?–34)
BASOPHILS # BLD AUTO: 0.03 X10(3) UL (ref 0–0.2)
BASOPHILS NFR BLD AUTO: 0.4 %
BILIRUB SERPL-MCNC: 0.4 MG/DL (ref 0.2–1.1)
BUN BLD-MCNC: 7 MG/DL (ref 9–23)
BUN/CREAT SERPL: 7.8 (ref 10–20)
CALCIUM BLD-MCNC: 9.4 MG/DL (ref 8.7–10.4)
CHLORIDE SERPL-SCNC: 105 MMOL/L (ref 98–112)
CHOLEST SERPL-MCNC: 191 MG/DL (ref ?–200)
CO2 SERPL-SCNC: 26 MMOL/L (ref 21–32)
CREAT BLD-MCNC: 0.9 MG/DL
DEPRECATED RDW RBC AUTO: 46.4 FL (ref 35.1–46.3)
EGFRCR SERPLBLD CKD-EPI 2021: 67 ML/MIN/1.73M2 (ref 60–?)
EOSINOPHIL # BLD AUTO: 0.19 X10(3) UL (ref 0–0.7)
EOSINOPHIL NFR BLD AUTO: 2.5 %
ERYTHROCYTE [DISTWIDTH] IN BLOOD BY AUTOMATED COUNT: 14.5 % (ref 11–15)
FASTING PATIENT LIPID ANSWER: NO
FASTING STATUS PATIENT QL REPORTED: NO
GLOBULIN PLAS-MCNC: 2.7 G/DL (ref 2–3.5)
GLUCOSE BLD-MCNC: 112 MG/DL (ref 70–99)
HCT VFR BLD AUTO: 39.1 %
HDLC SERPL-MCNC: 47 MG/DL (ref 40–59)
HGB BLD-MCNC: 12.9 G/DL
IMM GRANULOCYTES # BLD AUTO: 0.02 X10(3) UL (ref 0–1)
IMM GRANULOCYTES NFR BLD: 0.3 %
LDLC SERPL CALC-MCNC: 92 MG/DL (ref ?–100)
LYMPHOCYTES # BLD AUTO: 2.37 X10(3) UL (ref 1–4)
LYMPHOCYTES NFR BLD AUTO: 31.6 %
MCH RBC QN AUTO: 28.6 PG (ref 26–34)
MCHC RBC AUTO-ENTMCNC: 33 G/DL (ref 31–37)
MCV RBC AUTO: 86.7 FL
MONOCYTES # BLD AUTO: 0.45 X10(3) UL (ref 0.1–1)
MONOCYTES NFR BLD AUTO: 6 %
NEUTROPHILS # BLD AUTO: 4.45 X10 (3) UL (ref 1.5–7.7)
NEUTROPHILS # BLD AUTO: 4.45 X10(3) UL (ref 1.5–7.7)
NEUTROPHILS NFR BLD AUTO: 59.2 %
NONHDLC SERPL-MCNC: 144 MG/DL (ref ?–130)
OSMOLALITY SERPL CALC.SUM OF ELEC: 285 MOSM/KG (ref 275–295)
PLATELET # BLD AUTO: 248 10(3)UL (ref 150–450)
POTASSIUM SERPL-SCNC: 3.8 MMOL/L (ref 3.5–5.1)
PROT SERPL-MCNC: 7.4 G/DL (ref 5.7–8.2)
RBC # BLD AUTO: 4.51 X10(6)UL
SODIUM SERPL-SCNC: 138 MMOL/L (ref 136–145)
T4 FREE SERPL-MCNC: 1.1 NG/DL (ref 0.8–1.7)
TRIGL SERPL-MCNC: 314 MG/DL (ref 30–149)
TSI SER-ACNC: 2.26 UIU/ML (ref 0.55–4.78)
VLDLC SERPL CALC-MCNC: 52 MG/DL (ref 0–30)
WBC # BLD AUTO: 7.5 X10(3) UL (ref 4–11)

## 2025-03-11 PROCEDURE — G0439 PPPS, SUBSEQ VISIT: HCPCS | Performed by: INTERNAL MEDICINE

## 2025-03-11 PROCEDURE — 84443 ASSAY THYROID STIM HORMONE: CPT | Performed by: INTERNAL MEDICINE

## 2025-03-11 PROCEDURE — 36415 COLL VENOUS BLD VENIPUNCTURE: CPT | Performed by: INTERNAL MEDICINE

## 2025-03-11 PROCEDURE — 80053 COMPREHEN METABOLIC PANEL: CPT | Performed by: INTERNAL MEDICINE

## 2025-03-11 PROCEDURE — 3075F SYST BP GE 130 - 139MM HG: CPT | Performed by: INTERNAL MEDICINE

## 2025-03-11 PROCEDURE — 1125F AMNT PAIN NOTED PAIN PRSNT: CPT | Performed by: INTERNAL MEDICINE

## 2025-03-11 PROCEDURE — 85025 COMPLETE CBC W/AUTO DIFF WBC: CPT | Performed by: INTERNAL MEDICINE

## 2025-03-11 PROCEDURE — 80061 LIPID PANEL: CPT | Performed by: INTERNAL MEDICINE

## 2025-03-11 PROCEDURE — 1170F FXNL STATUS ASSESSED: CPT | Performed by: INTERNAL MEDICINE

## 2025-03-11 PROCEDURE — 3079F DIAST BP 80-89 MM HG: CPT | Performed by: INTERNAL MEDICINE

## 2025-03-11 PROCEDURE — 84439 ASSAY OF FREE THYROXINE: CPT | Performed by: INTERNAL MEDICINE

## 2025-03-11 PROCEDURE — 96160 PT-FOCUSED HLTH RISK ASSMT: CPT | Performed by: INTERNAL MEDICINE

## 2025-03-11 PROCEDURE — 1159F MED LIST DOCD IN RCRD: CPT | Performed by: INTERNAL MEDICINE

## 2025-03-11 PROCEDURE — 99213 OFFICE O/P EST LOW 20 MIN: CPT | Performed by: INTERNAL MEDICINE

## 2025-03-11 RX ORDER — CHLORAL HYDRATE 500 MG
1000 CAPSULE ORAL DAILY
COMMUNITY

## 2025-03-12 NOTE — PROGRESS NOTES
Subjective:   Ela Mckeon is a 74 year old female who presents for a MA AHA (Medicare Advantage Annual Health Assessment) and scheduled follow up of multiple significant but stable problems.     HTN  Long standing history of hypertension     sympotms  :        Headache no  dizziness        no                             Blurred vision no  palpitaionsSyncope no  Chest pain  no  PND  Orthopnea no  Weakness  No  Low salt diet    yes    Compliance with exercise stays active  Compliance with medication yes                                              History/Other:   Fall Risk Assessment:   She has been screened for Falls and is High Risk. Fall Prevention information provided to patient in After Visit Summary.    Do you feel unsteady when standing or walking?: (Patient-Rptd) No  Do you worry about falling?: (Patient-Rptd) No  Have you fallen in the past year?: (Patient-Rptd) Yes  How many times have you fallen?: (Patient-Rptd) (P) 1  Were you injured?: (Patient-Rptd) (P) Yes     Cognitive Assessment:   She had a completely normal cognitive assessment - see flowsheet entries     Functional Ability/Status:   Ela Mckeon has a completely normal functional assessment. See flowsheet for details.      Depression Screening (PHQ):  PHQ-2 SCORE: 0  , done 3/8/2025            Advanced Directives:   She does NOT have a Living Will. [Do you have a living will?: (Patient-Rptd) No]  She does NOT have a Power of  for Health Care. [Do you have a healthcare power of ?: (Patient-Rptd) No]  Discussed Advance Care Planning with patient (and family/surrogate if present). Standard forms made available to patient in After Visit Summary.      Patient Active Problem List   Diagnosis    Age-related osteoporosis without current pathological fracture    Goiter    Orthopedic aftercare     Allergies:  She has No Known Allergies.    Current Medications:  Outpatient Medications Marked as Taking for the 3/11/25 encounter (Office  Visit) with Nigel Downs MD   Medication Sig    omega-3 fatty acids 1000 MG Oral Cap Take 1,000 mg by mouth daily.    atorvastatin 20 MG Oral Tab Take 1 tablet (20 mg total) by mouth nightly.    losartan 50 MG Oral Tab Take 1 tablet (50 mg total) by mouth daily.       Medical History:  She  has a past medical history of Arthritis (2011), Bell's palsy (2018), Colon adenomas (10/13/2020), Goiter (11/10/2020), High blood pressure, High cholesterol, and Hyperlipidemia (2005).  Surgical History:  She  has a past surgical history that includes colonoscopy (12/2000); colonoscopy; colonoscopy (N/A, 10/13/2020); and colonoscopy (N/A, 8/14/2023).   Family History:  Her family history includes Heart Disorder in her father; Hypertension in her sister; Renal Disease in her mother.  Social History:  She  reports that she has never smoked. She has never been exposed to tobacco smoke. She has never used smokeless tobacco. She reports that she does not drink alcohol and does not use drugs.    Tobacco:  She has never smoked tobacco.    CAGE Alcohol Screen:   CAGE screening score of 0 on 3/8/2025, showing low risk of alcohol abuse.      Patient Care Team:  Nigel Downs MD as PCP - General (Internal Medicine)    Review of Systems   Constitutional:  Negative for activity change, chills, fatigue and fever.   HENT:  Negative for ear discharge, nosebleeds, postnasal drip, rhinorrhea, sinus pressure and sore throat.    Eyes:  Negative for pain, discharge and redness.   Respiratory:  Negative for cough, chest tightness, shortness of breath and wheezing.    Cardiovascular:  Negative for chest pain, palpitations and leg swelling.   Gastrointestinal:  Negative for abdominal pain, blood in stool, constipation, diarrhea, nausea and vomiting.   Genitourinary:  Negative for difficulty urinating, dysuria, frequency, hematuria and urgency.   Musculoskeletal:  Negative for back pain, gait problem and joint swelling.   Skin:  Negative for rash.    Neurological:  Negative for syncope, weakness, light-headedness and headaches.   Psychiatric/Behavioral:  Negative for dysphoric mood. The patient is not nervous/anxious.          Objective:   Physical Exam  Constitutional:       Appearance: She is well-developed. She is not ill-appearing.   HENT:      Right Ear: Ear canal normal.      Left Ear: Ear canal normal.      Mouth/Throat:      Pharynx: Oropharynx is clear.   Eyes:      Extraocular Movements: Extraocular movements intact.      Conjunctiva/sclera: Conjunctivae normal.      Pupils: Pupils are equal, round, and reactive to light.   Cardiovascular:      Rate and Rhythm: Normal rate and regular rhythm.      Heart sounds: Normal heart sounds.   Pulmonary:      Effort: Pulmonary effort is normal.      Breath sounds: Normal breath sounds.   Abdominal:      General: Bowel sounds are normal.      Palpations: Abdomen is soft.   Skin:     General: Skin is warm and dry.   Neurological:      Mental Status: She is alert.   Psychiatric:         Mood and Affect: Mood normal.           /81 (BP Location: Right arm, Patient Position: Sitting, Cuff Size: adult)   Pulse 89   Temp 97.7 °F (36.5 °C) (Temporal)   Ht 5' 6\" (1.676 m)   BMI 27.28 kg/m²  Estimated body mass index is 27.28 kg/m² as calculated from the following:    Height as of this encounter: 5' 6\" (1.676 m).    Weight as of 1/8/25: 169 lb (76.7 kg).    Medicare Hearing Assessment:   Hearing Screening    Entry User: Ailin Fry CMA  Screening Method: Questionnaire  I have a problem hearing over the telephone: No I have trouble following the conversations when two or more people are talking at the same time: No   I have trouble understanding things on the TV: No I have to strain to understand conversations: No   I have to worry about missing the telephone ring or doorbell: No I have trouble hearing conversations in a noisy background such as a crowded room or restaurant: No   I get confused about where  sounds come from: No I misunderstand some words in a sentence and need to ask people to repeat themselves: No   I especially have trouble understanding the speech of women and children: No I have trouble understanding the speaker in a large room such as at a meeting or place of Hindu: No   Many people I talk to seem to mumble (or don't speak clearly): No People get annoyed because I misunderstand what they say: No   I misunderstand what others are saying and make inappropriate responses: No I avoid social activities because I cannot hear well and fear I will reply improperly: No   Family members and friends have told me they think I may have hearing loss: No                  Assessment & Plan:   Ela Mckeon is a 74 year old female who presents for a Medicare Assessment.     (Z00.00) Medicare annual wellness visit, subsequent  (primary encounter diagnosis)  Plan: Patient is independent with ADL.s    Health screening   Colonoscopy, mammography, dexa scan  And routine eye exam advised.      (I10) Primary hypertension  Plan:/81 (BP Location: Right arm, Patient Position: Sitting, Cuff Size: adult)   Pulse 89   Temp 97.7 °F (36.5 °C) (Temporal)   Ht 5' 6\" (1.676 m)   BMI 27.28 kg/m²   Controlled monitor    (E78.5) Hyperlipidemia, unspecified hyperlipidemia type  Plan: CBC With Differential With Platelet, Comp         Metabolic Panel (14), Lipid Panel, TSH and Free        T4        Low cholesterol diet advised  Avoid saturated and trans fats       (Z12.31) Visit for screening mammogram  Plan: [facility] BRENDA 2D+3D SCREENING BILAT         (CPT=77067/55701)        .Breast exam.  Bilateral  No discrete palpable masses or tenderness    No nipple discharge  And no axillary adenopathy.  Self breast exam advised      (Z78.0) Postmenopausal  Plan: XR DEXA BONE DENSITOMETRY (CPT=77080)        Asymptomatic  monitor      (M81.0) Age-related osteoporosis without current pathological fracture  Plan: Asymptomatic   monitor      (E04.9) Goiter  Plan: chronic   monitor  stable    Patient voiced understanding  and agrees with plan  Medications and most recent test results reviewed  Refill medicaitons  as needed  Potential side effect discussed  Modification of risk for CAD advised    Dietary an lifestyle change  Pt voiced understanding and agrees with plan  Pt given time to ask questions  After Visit Summary handout    Discussed  And given to patient.        The patient indicates understanding of these issues and agrees to the plan.  Reinforced healthy diet, lifestyle, and exercise.      No follow-ups on file.     Nigel Downs MD, 3/11/2025     Supplementary Documentation:   General Health:  In the past six months, have you lost more than 10 pounds without trying?: (Patient-Rptd) 2 - No  Has your appetite been poor?: (Patient-Rptd) No  Type of Diet: (Patient-Rptd) Balanced  How does the patient maintain a good energy level?: (Patient-Rptd) Appropriate Exercise  How would you describe your daily physical activity?: (Patient-Rptd) Light  How would you describe your current health state?: (Patient-Rptd) Good  How do you maintain positive mental well-being?: (Patient-Rptd) Social Interaction  On a scale of 0 to 10, with 0 being no pain and 10 being severe pain, what is your pain level?: (Patient-Rptd) 1 - (Mild)  In the past six months, have you experienced urine leakage?: (Patient-Rptd) 0-No  At any time do you feel concerned for the safety/well-being of yourself and/or your children, in your home or elsewhere?: No  Have you had any immunizations at another office such as Influenza, Hepatitis B, Tetanus, or Pneumococcal?: (Patient-Rptd) No    Health Maintenance   Topic Date Due    Zoster Vaccines (1 of 2) Never done    COVID-19 Vaccine (4 - 2024-25 season) 09/01/2024    Influenza Vaccine (1) 10/01/2024    Annual Well Visit  01/01/2025    Mammogram  04/27/2025    Colorectal Cancer Screening  08/14/2026    DEXA Scan  Completed     Annual Depression Screening  Completed    Fall Risk Screening (Annual)  Completed    Pneumococcal Vaccine: 50+ Years  Completed    Meningococcal B Vaccine  Aged Out

## 2025-03-13 ENCOUNTER — LAB ENCOUNTER (OUTPATIENT)
Dept: LAB | Age: 75
End: 2025-03-13
Attending: INTERNAL MEDICINE
Payer: MEDICARE

## 2025-03-13 ENCOUNTER — OFFICE VISIT (OUTPATIENT)
Dept: OCCUPATIONAL MEDICINE | Facility: HOSPITAL | Age: 75
End: 2025-03-13
Attending: ORTHOPAEDIC SURGERY
Payer: MEDICARE

## 2025-03-13 DIAGNOSIS — I10 PRIMARY HYPERTENSION: ICD-10-CM

## 2025-03-13 LAB
BILIRUB UR QL: NEGATIVE
CLARITY UR: CLEAR
COLOR UR: YELLOW
GLUCOSE UR-MCNC: NEGATIVE MG/DL
HGB UR QL STRIP.AUTO: NEGATIVE
KETONES UR-MCNC: NEGATIVE MG/DL
NITRITE UR QL STRIP.AUTO: NEGATIVE
PH UR: 7 [PH] (ref 5–8)
PROT UR-MCNC: NEGATIVE MG/DL
SP GR UR STRIP: 1.01 (ref 1–1.03)
UROBILINOGEN UR STRIP-ACNC: 0.2

## 2025-03-13 PROCEDURE — 81015 MICROSCOPIC EXAM OF URINE: CPT

## 2025-03-13 PROCEDURE — 97110 THERAPEUTIC EXERCISES: CPT

## 2025-03-13 PROCEDURE — 81001 URINALYSIS AUTO W/SCOPE: CPT

## 2025-03-13 NOTE — PROGRESS NOTES
Progress Summary  Pt has attended 16 visits in Occupational Therapy.     Diagnosis:   Post left distal radius fracture, Orthopedic aftercare (Z47.89)       Referring Provider: Edil Ramos  Date of Evaluation:    12/19/2024    Precautions:  arthritis,HTN Next MD visit: none scheduled  Date of Surgery: n/a  Date of Injury: 09/18/24   Insurance Primary/Secondary: BCBS OUT OF STATE / N/A     # Auth Visits: 16 visits 12/19-4/24            Subjective: Pt states really trying her exercises and feeling improvements. \"I am working so hard on closing my fist.\"    Pain: 2/10      Objective: See exercises flowsheet below for exercises and activities performed today.     ROM: (* denotes performed with pain)  Wrist Eval Wrist 1/22/2025 Wrist 3/13/2025   Flexion: R 65, L 20  Extension: R 55, L 15  Ulnar Deviation: R 25, L 0  Radial Deviation R 5, L 0 Flexion: L 30  Extension: L 30  Ulnar Deviation: L 10  Radial Deviation L 10 Flexion: L  40  Extension: L 30     AROM/PROM:(Degrees)    LEFT HAND 1/22/2025:    Thumb IF MF RF SF   MP 0/38 0/75 0/80 0/75 0/65   PIP  0/35 0/34 0/30 0/34   DIP 0/35 0/25 0/18 0/20 0/30   JAMES 73  135 132 125 129     LEFT HAND 3/13/2025:    Thumb IF MF RF SF   MP 0/40 0/80 0/80 0/80 0/75   PIP  0/30 0/55 0/50 0/70   DIP 0/50 0/40 0/30 0/45 0/50   JAMES 90 150 165 175 195       Strength (lbs) Right Average Left Average   1/22/25 : 31 6   3/13/2025    NT 12         Assessment: Pt making strong gains in skilled OT. Pt continues to improve in AROM and strength as seen above. Pt demo's each session her dedication and motivation for improvement. Pt is compliant and independent in her HEP. Pt would benefit from continued skilled OT to reach functional AROM and strengthening.     Goals: (16 visits)  Pt will be independent and compliant with comprehensive HEP to maintain progress achieved in OT-MET, updated  Pt will increase their (L) wrist flexion to at least 50 degrees for ease of  dressing-PROGRESSING TOWARDS  Pt will increase their (L) wrist extension  to at least 50  degrees for ease bathing-PROGRESSING TOWARDS  Updated Goals 1/22/2025:   Pt will increase their (L) thumb JAMES to at least 100 degrees for ease of opening doors.-PROGRESSING TOWARDS  Pt will increase their (L) IF JAMES to at least 200 degrees for ease of manipulating buttons.-PROGRESSING TOWARDS  Pt will increase their (L) MF JAMES to at least 200 degrees for ease of holding steering wheel.-PROGRESSING TOWARDS  Pt will increase their (L) RF JAMES to at least 200  degrees for ease of holding cup.-PROGRESSING TOWARDS  Pt will increase their (L) SF JAMES to at least 200 degrees for ease of cupping medications.-PROGRESSING TOWARDS  Pt will increase their (L)  strength to at least 20# for ease of carying groceries.-PROGRESSING TOWARDS      Plan: Continue skilled Occupational Therapy  1-2x/week or a total of 8 visits over a 90 day period. Treatment will include: Manual Therapy, Soft tissue mobilization, AROM, PROM, Strengthening, Therapeutic Activity, Moist heat, cryotherapy, Ultrasound, Whirlpool (fluidotherapy), Paraffin, Electrical Stim, Orthosis,  Neuromuscular Re-education, Patient education, Home exercise program,           Patient/Family/Caregiver was advised of these findings, precautions, and treatment options and has agreed to actively participate in planning and for this course of care.    Thank you for your referral. If you have any questions, please contact me at Dept: [phone].    Sincerely,  Electronically signed by therapist: Carisa Stone OT     Physician's certification required:  Yes  Please co-sign or sign and return this letter via fax as soon as possible to [phone].   I certify the need for these services furnished under this plan of treatment and while under my care.    X___________________________________________________ Date____________________    Certification From: 3/13/2025  To:6/11/2025     Date  12/31/2024   1/3/2025   1/6/2025  1/8/2025  1/14/2025   1/16/2025  1/22/2025   1/27/2025  1/30/2025 2/4/2025 2/6/2025 2/10/2025  3/3/2025 3/5/2025 3/13/2025   Visit # 2/8  3/8  4/8 5/8  6/8  7/8  8/8 9/16 10/16 11/16 12/16 13/16 14/16 15/16 16/16                              Evaluation                        Manual                                       PROM composite flexion 10x1 PROM composite flexion 10x1, 5 sec holds ECU pin and stretch 10x1   STM to dorsum of forearm and hand (8 mins)  STM to thenar eminence and web space (8 mins)   STM while passively ranging into composite flexion  PROM of each digit into flexion then composite PROM    MCP and PIP joint mobs grade III  PROM of each digit into flexion then composite PROM  IASTM to forearm extensors      Ther ex                                         Shoulder flexion 20x1    Composite flexion 20x1    2pt pinch velcro block removal and placement    Forearm sup/pro 10x2    Wrist flex/extend 10x2    Wrist RD/UD 10x2 Towel slides 10x2    Wrist flex/extend 10x2    Shoulder rolls 10x2    Shoulder external/internal rotation 20x1    Tendon glides 10x2    ExerStick wrist flex/extend (2 mins)    Thumb flex/extend 10x2    Thumb opposition to 3rd digit 10x1    Thumb palmar abd 10x2 Fluidotherapy with tendon glides (8 mins)    Composite flexion 10x2    Opposition 10x2    Joint blocking PIP and DIP flexion 10x2    Thumb flex/extend 10x2    Wrist flex/extend 10x2    Wrist RD/UD 10x2   Fluidotherapy with tendon glides (8 mins)    Tennis ball palmar abduction 10x2    Opposition 10x2    Joint blocking PIP and DIP flexion 10x2      Thumb flex/extend 10x2    Wrist flex/extend while holding dowel 10x2    RD/UD 10x2 with dowel    Dart throwers 10x2    ExerStick wrist flex/extend (2 mins x2) Tendon glides one position at a time 10x1    Wooden block manipulation:   -cupping  -alternating tip pinch transportation  -thumb<>palm translation  (10 mins)    Composite digital flexion around  red flexbar 10x3    ExerStick wrist flex/extend (2 mins x2)    Thumb flex/extend 10x2    Wrist flex/extend off ramp 30x1    Red web  -weight bearing extension presses 10x2 Tendon glides one position at a time 10x1    Alternating tip pinch marble transportation    2pt pinch peg placement onto board (34 pegs)    Lumbrical strengthening through use of clamp to flex Mps with PIP/DIP straight to grasp and transport cotton balls from table to container     Wrist flex/extend with composite digital flexion 10x2    Thumb flex/extend 10x2    Thumb joint blocking into flexion IP 10x2    Radial abduction of thumb 10x2    Palmar abduction Tendon glides one position at a time 10x1    PIP and DIP joint blocking flexion 10x2    Thumb flex/extend 10x2    Wrist flex/extend off ramp 30x1    Alternating tip pinch large peg placement onto board into cupping of two at a time Composite digital flexion into red web 10x1, 10 secs    Red web  10x2    Red web lumbrical squeeze 10x2    Dart throwers with tennis ball 10x2    Rubber band digital extension, abduction 10x2 Fluidotherapy with tendon glides (8 mins)    MCP lifts 10x2    Blue Therafoam Ex:  -  -lateral pinch  -3pt pinch  -alternating tip pinch  -thumb flexion  -composite tip pinch   10x2     Thumb flex/extend 10x2    1# wrist flex/extend 5x4    Red web  -weight bearing extension presses  -  -lateral pinch  10x2    Yellow putty ex:   -opposition  -adduction  -MCP flexion presses  (10 minutes)    PIP flexion joint blocking 15x1    Hook touches with large wooden dowel  15x2     Fluidotherapy with tendon glides (8 mins)    PIP flexion joint blocking 15x1    Hook touches with large wooden dowel  20x2    Red web  -composite tip   lateral pinch  10x2         Fluidotherapy with tendon glides (8 mins)    Composite flexion 20x1    PIP flexion joint blocking 20x1    Hook touches with large wooden dowel  20x2    Yellow Putty Ex:   - composite flexion cone presses  10x2  -opposition 30x1  -straight fist 10x2 Composite flexion 20x1    Red web  10x2    3pt with green pin removal and placement of velcro blocks     Yellow flexbar 6# bilateral sup/pro 10x2  -\"T\" bends    Hook curls 10x2 Fluidotherapy with tendon glides (8 mins)    Red web  10x2    1.5 Digiflex iso digital flexion 10x2    1.5# digiflex composite flexion 10x2    Yellow flexbar 6# bilateral sup/pro 10x2      Yellow Putty Ex:   -FDP glides  -straight fist squeezes FDS glide  -tip pinch Yellow Putty Ex:   -FDP glides  -straight fist squeezes FDS glide  -tip pinch  -lumbrical squeezes  -hook     Wrist AROM flex/extend 10x3    Composite digital flex/extend 10x3       HEP instruction                           Therapeutic Activity                                      Sponge cupping and transportation     Large poker chip location and transportation [employer] Stacking 5 cones 4x1    Composite grasp cone slides of marbles 5x4 Alternating tip pinch pom-pom transportation (8 mins)     Functional cupping of marbles and transportation into container      Functional cupping of marbles and transportation into container (3-4 at a time)   Functional cupping of marbles and transportation into container (5 at a time) Functional cupping of  and transportation into container (5 at a time) Functional cupping of  and transportation of poker chips into container (5 at a time) Functional cupping and transportation of marbles from sensory bin (5 mins)    Neuromuscular Re-education                                                       Contract relax composite flexion with isometric against therapist resistance 10x1 Contract relax composite flexion with isometric against therapist resistance 10x2 Contract relax composite flexion with isometric holds against therapist resistance 10x2 Contract relax composite flexion with isometric holds against therapist resistance 10x2     Modalities                                    Moist heat 3 mins  prior to exercises           Moist heat 3 mins prior to exercises  Moist heat 3 mins post to exercises           HEP:  Assignment date:   tendon glides, AROM: wrist flex/extend, RD/UD, forearm sup/pronation, shoulder flexion towel slides 12/19/24   Joint blocking at PIP 1/6/2025    Blue therafoam exercises 1/30/2025      Charges: TE 3  Total Timed Treatment: 40 min  Total Treatment Time: 43 min

## 2025-03-17 ENCOUNTER — OFFICE VISIT (OUTPATIENT)
Dept: OCCUPATIONAL MEDICINE | Facility: HOSPITAL | Age: 75
End: 2025-03-17
Attending: ORTHOPAEDIC SURGERY
Payer: MEDICARE

## 2025-03-17 PROCEDURE — 97110 THERAPEUTIC EXERCISES: CPT

## 2025-03-17 NOTE — PROGRESS NOTES
Diagnosis:   Post left distal radius fracture, Orthopedic aftercare (Z47.89)       Referring Provider: Edil Ramos  Date of Evaluation:    12/19/2024    Precautions:  arthritis,HTN Next MD visit: none scheduled  Date of Surgery: n/a  Date of Injury: 09/18/24   Insurance Primary/Secondary: BCBS OUT OF STATE / N/A     # Auth Visits: 16 visits 12/19-4/24            Subjective: Pt states feeling stiffer today. Pt requested early dismissal to  her grandson.     Pain: 4/10      Objective: See exercises flowsheet below for exercises and activities performed today.         Assessment: Pt demo'd good MCP flexion and improved PIP flexion, however, DIP remains minimally flexed. Theraputty exercises added to HEP once pt demo'd independence. Adduction and hook presses most challenging for pt.       Goals: (24 visits)  Pt will be independent and compliant with comprehensive HEP to maintain progress achieved in OT-MET, updated  Pt will increase their (L) wrist flexion to at least 50 degrees for ease of dressing-PROGRESSING TOWARDS  Pt will increase their (L) wrist extension  to at least 50  degrees for ease bathing-PROGRESSING TOWARDS  Updated Goals 1/22/2025:   Pt will increase their (L) thumb JAMES to at least 100 degrees for ease of opening doors.-PROGRESSING TOWARDS  Pt will increase their (L) IF JAMES to at least 200 degrees for ease of manipulating buttons.-PROGRESSING TOWARDS  Pt will increase their (L) MF JAMES to at least 200 degrees for ease of holding steering wheel.-PROGRESSING TOWARDS  Pt will increase their (L) RF JAMES to at least 200  degrees for ease of holding cup.-PROGRESSING TOWARDS  Pt will increase their (L) SF JAMES to at least 200 degrees for ease of cupping medications.-PROGRESSING TOWARDS  Pt will increase their (L)  strength to at least 20# for ease of carying groceries.-PROGRESSING TOWARDS      Plan: start with fluidotherapy again into IASTM to the forearm and hand, follow up on Putty  exercises added to HEP, joint blocking of PIP and DIP most important to continue    Date 3/17/2025                 Visit # 17/24                                    Evaluation                 Manual                                                                             Ther ex                   Fluidotherapy with AROM  7 mins                 Joint blocking for PIP and DIP flexion 15x1                 Red Putty Exercises -  -alternating opposition tip pinch  -adduction  -PIP/DIP hook   -lumbrical extension  10x2                                                                                                 HEP instruction                                        Therapeutic Activity                                       Neuromuscular Re-education                                                             Modalities                                                                 HEP:  Assignment date:   tendon glides, AROM: wrist flex/extend, RD/UD, forearm sup/pronation, shoulder flexion towel slides 12/19/24   Joint blocking at PIP 1/6/2025    Blue therafoam exercises 1/30/2025   Pink Putty Exercises 3/17/2025        Access Code: [email or web address]  URL: [email or web address]  Date: 03/17/2025  Prepared by: Carisa Stone    Exercises  - Putty Squeezes  - 1 x daily - 3 x weekly - 2 sets - 10 reps  - Tip Pinch with Putty  - 1 x daily - 3 x weekly - 2 sets - 10 reps  - Seated Claw Fist with Putty  - 1 x daily - 3 x weekly - 2 sets - 10 reps  - Finger Adduction with Putty  - 1 x daily - 3 x weekly - 1 sets - 10 reps      Charges: TE 3  Total Timed Treatment: 38 min  Total Treatment Time: 38 min

## 2025-03-19 ENCOUNTER — OFFICE VISIT (OUTPATIENT)
Dept: OCCUPATIONAL MEDICINE | Facility: HOSPITAL | Age: 75
End: 2025-03-19
Attending: ORTHOPAEDIC SURGERY
Payer: MEDICARE

## 2025-03-19 PROCEDURE — 97110 THERAPEUTIC EXERCISES: CPT

## 2025-03-19 NOTE — PROGRESS NOTES
Diagnosis:   Post left distal radius fracture, Orthopedic aftercare (Z47.89)       Referring Provider: Edil Ramos  Date of Evaluation:    12/19/2024    Precautions:  arthritis,HTN Next MD visit: none scheduled  Date of Surgery: n/a  Date of Injury: 09/18/24   Insurance Primary/Secondary: [employer] OUT OF STATE / N/A     # Auth Visits: 16 visits 12/19-4/24            Subjective: Pt states feeling better. She did take a pain pill today. Pt requested early dismissal again to  her grandson. Pt states not getting to her HEP.    Pain: 3/10      Objective: See exercises flowsheet below for exercises and activities performed today.         Assessment: Pt demo'd good carry over of her assigned HEP. Composite flexion continues to improve PIP and DIP limited.       Goals: (24 visits)  Pt will be independent and compliant with comprehensive HEP to maintain progress achieved in OT-MET, updated  Pt will increase their (L) wrist flexion to at least 50 degrees for ease of dressing-PROGRESSING TOWARDS  Pt will increase their (L) wrist extension  to at least 50  degrees for ease bathing-PROGRESSING TOWARDS  Updated Goals 1/22/2025:   Pt will increase their (L) thumb JAMES to at least 100 degrees for ease of opening doors.-PROGRESSING TOWARDS  Pt will increase their (L) IF JAMES to at least 200 degrees for ease of manipulating buttons.-PROGRESSING TOWARDS  Pt will increase their (L) MF JAMES to at least 200 degrees for ease of holding steering wheel.-PROGRESSING TOWARDS  Pt will increase their (L) RF JAMES to at least 200  degrees for ease of holding cup.-PROGRESSING TOWARDS  Pt will increase their (L) SF JAMES to at least 200 degrees for ease of cupping medications.-PROGRESSING TOWARDS  Pt will increase their (L)  strength to at least 20# for ease of carying groceries.-PROGRESSING TOWARDS      Plan: follow up on Putty exercises added to HEP, joint blocking of PIP and DIP most important to continue    Date 3/17/2025                  Visit # 17/24                                    Evaluation                 Manual                                                                             Ther ex                   Fluidotherapy with AROM  7 mins 7 mins               Joint blocking for PIP and DIP flexion 15x1                 Red Putty Exercises -  -alternating opposition tip pinch  -adduction  -PIP/DIP hook   -lumbrical extension  10x2 -  -alternating opposition tip pinch  -adduction  10x2  -lumbrical squeezes 10x1               Wrist flexion with composite digital flexion   10x2                                                                           HEP instruction                                        Therapeutic Activity                                       Neuromuscular Re-education                                                             Modalities                                                                 HEP:  Assignment date:   tendon glides, AROM: wrist flex/extend, RD/UD, forearm sup/pronation, shoulder flexion towel slides 12/19/24   Joint blocking at PIP 1/6/2025    Blue therafoam exercises 1/30/2025   Pink Putty Exercises 3/17/2025        Access Code: [email or web address]  URL: [email or web address]  Date: 03/17/2025  Prepared by: Carisa tSone    Exercises  - Putty Squeezes  - 1 x daily - 3 x weekly - 2 sets - 10 reps  - Tip Pinch with Putty  - 1 x daily - 3 x weekly - 2 sets - 10 reps  - Seated Claw Fist with Putty  - 1 x daily - 3 x weekly - 2 sets - 10 reps  - Finger Adduction with Putty  - 1 x daily - 3 x weekly - 1 sets - 10 reps      Charges: TE 3  Total Timed Treatment: 40 min  Total Treatment Time: 40 min

## 2025-03-24 ENCOUNTER — TELEPHONE (OUTPATIENT)
Dept: OCCUPATIONAL MEDICINE | Facility: HOSPITAL | Age: 75
End: 2025-03-24

## 2025-03-24 ENCOUNTER — APPOINTMENT (OUTPATIENT)
Dept: OCCUPATIONAL MEDICINE | Facility: HOSPITAL | Age: 75
End: 2025-03-24
Attending: ORTHOPAEDIC SURGERY
Payer: MEDICARE

## 2025-03-25 ENCOUNTER — TELEPHONE (OUTPATIENT)
Dept: PHYSICAL THERAPY | Facility: HOSPITAL | Age: 75
End: 2025-03-25

## 2025-03-26 ENCOUNTER — APPOINTMENT (OUTPATIENT)
Dept: OCCUPATIONAL MEDICINE | Facility: HOSPITAL | Age: 75
End: 2025-03-26
Attending: ORTHOPAEDIC SURGERY
Payer: MEDICARE

## 2025-04-08 ENCOUNTER — TELEPHONE (OUTPATIENT)
Dept: PHYSICAL THERAPY | Facility: HOSPITAL | Age: 75
End: 2025-04-08

## 2025-04-15 ENCOUNTER — APPOINTMENT (OUTPATIENT)
Dept: OCCUPATIONAL MEDICINE | Facility: HOSPITAL | Age: 75
End: 2025-04-15
Attending: ORTHOPAEDIC SURGERY
Payer: MEDICARE

## 2025-04-21 ENCOUNTER — OFFICE VISIT (OUTPATIENT)
Dept: OCCUPATIONAL MEDICINE | Facility: HOSPITAL | Age: 75
End: 2025-04-21
Attending: ORTHOPAEDIC SURGERY
Payer: MEDICARE

## 2025-04-21 PROCEDURE — 97140 MANUAL THERAPY 1/> REGIONS: CPT

## 2025-04-21 PROCEDURE — 97110 THERAPEUTIC EXERCISES: CPT

## 2025-04-21 NOTE — PROGRESS NOTES
Occupational Therapy Progress Summary      Progress Summary  Pt has attended 19 visits in Occupational Therapy.        Subjective: Patient states that her fingers are still so stiff, and it is difficult to hold things     Pain: 3/10      Objective: See exercises flowsheet below for exercises and activities performed today.     ROM: (* denotes performed with pain)  Wrist Eval Wrist 1/22/2025 Wrist 3/13/2025 4/21/2025     Flexion: R 65, L 20  Extension: R 55, L 15  Ulnar Deviation: R 25, L 0  Radial Deviation R 5, L 0 Flexion: L 30  Extension: L 30  Ulnar Deviation: L 10  Radial Deviation L 10 Flexion: L  40  Extension: L 30 Flexion  30  Extension 45  UD 20  RD 10       LEFT HAND 3/13/2025:    Thumb IF MF RF SF   MP 0/40 0/80 0/80 0/80 0/75   PIP  0/30 0/55 0/50 0/70   DIP 0/50 0/40 0/30 0/45 0/50   JAMES 90 150 165 175 195       LEFT HAND 4/21/2025     Thumb IF MF RF SF   MP 45 0/80 0/85 0/80 0/60   PIP  0/70 0/65 0/55 0/75   DIP 60 0/30 0/30 0/40 0/60   JAMES  180 180 175 195       Assessment:     Patient presents with increased wrist extension, but flexion is actually worse. Digital ROM remains painful and limited, impacting pt. Ability to grasp, open container, hold utensils.do daily household chores. She will benefit from continued therapy to help improve ROM, strength to maximize overall functional use of her hand and wrist. She may also benefit from a BRENDAN brace to help improve digital ROM. Requesting 8 additional visit for a total of 32 visits. Continue current goals     Goals: (24 visits)  Pt will be independent and compliant with comprehensive HEP to maintain progress achieved in OT-MET, updated  Pt will increase their (L) wrist flexion to at least 50 degrees for ease of dressing-PROGRESSING TOWARDS  Pt will increase their (L) wrist extension  to at least 50  degrees for ease bathing-PROGRESSING TOWARDS  Updated Goals 1/22/2025:   Pt will increase their (L) thumb JAMES to at least 100 degrees for ease of opening  doors.-PROGRESSING TOWARDS  Pt will increase their (L) IF JAMES to at least 200 degrees for ease of manipulating buttons.-PROGRESSING TOWARDS  Pt will increase their (L) MF JAMES to at least 200 degrees for ease of holding steering wheel.-PROGRESSING TOWARDS  Pt will increase their (L) RF JAMES to at least 200  degrees for ease of holding cup.-PROGRESSING TOWARDS  Pt will increase their (L) SF JAMES to at least 200 degrees for ease of cupping medications.-PROGRESSING TOWARDS  Pt will increase their (L)  strength to at least 20# for ease of carying groceries.-PROGRESSING TOWARDS  New goal  4/22/2025  Pt. Will identify 3-5 activity modification techniques for improved functional use of her hand    QuickDASH Outcome Score  Score: (Patient-Rptd) 84.09 % (12/17/2024  1:38 PM)        Plan: Continue skilled Occupational Therapy 1-2 x/week or a total of 32 visits over a 90 day period ( 5 remaining in this treatment plan with 8 additional for a total of 13  Treatment will include: ROM, possible BRENDAN brace for digital ROM , strengthening and activity modification        Patient/Family/Caregiver was advised of these findings, precautions, and treatment options and has agreed to actively participate in planning and for this course of care.    Thank you for your referral. If you have any questions, please contact me at Dept: [phone].    Sincerely,  Electronically signed by therapist: Alise Allan OT     Physician's certification required:  Yes  Please co-sign or sign and return this letter via fax as soon as possible to [phone].   I certify the need for these services furnished under this plan of treatment and while under my care.    X___________________________________________________ Date____________________    Certification From: 4/21/2025  To:7/20/2025      Date 3/17/2025    4/21/2025                 Visit # 17/24 19/24                                Evaluation                 Manual                        STM  forearm flexors and extensors   PIP joint mobs   15 min                                                     Ther ex     TE  30 min             Fluidotherapy with AROM  7 mins 7 mins  PROM/AROM/AAROM wrist and forearm    Digital joint blocking DIP and PIP joints    Place and hold  Fabricated a flexion strap for composite flexion to wear 4 x per day for 10 min  Towel crumple                 Joint blocking for PIP and DIP flexion 15x1                 Red Putty Exercises -  -alternating opposition tip pinch  -adduction  -PIP/DIP hook   -lumbrical extension  10x2 -  -alternating opposition tip pinch  -adduction  10x2  -lumbrical squeezes 10x1               Wrist flexion with composite digital flexion   10x2                                                                           HEP instruction                                        Therapeutic Activity                                       Neuromuscular Re-education                                                             Modalities                                                                 HEP:  Assignment date:   tendon glides, AROM: wrist flex/extend, RD/UD, forearm sup/pronation, shoulder flexion towel slides 12/19/24   Joint blocking at PIP 1/6/2025    Blue therafoam exercises 1/30/2025   Pink Putty Exercises 3/17/2025        Access Code: [email or web address]  URL: [email or web address]  Date: 03/17/2025  Prepared by: Carisa Stone    Exercises  - Putty Squeezes  - 1 x daily - 3 x weekly - 2 sets - 10 reps  - Tip Pinch with Putty  - 1 x daily - 3 x weekly - 2 sets - 10 reps  - Seated Claw Fist with Putty  - 1 x daily - 3 x weekly - 2 sets - 10 reps  - Finger Adduction with Putty  - 1 x daily - 3 x weekly - 1 sets - 10 reps      Charges: TE 2, manual therapy x 1  Total Timed Treatment: 45 min  Total Treatment Time: 45 min

## 2025-04-23 ENCOUNTER — APPOINTMENT (OUTPATIENT)
Dept: OCCUPATIONAL MEDICINE | Facility: HOSPITAL | Age: 75
End: 2025-04-23
Attending: ORTHOPAEDIC SURGERY
Payer: MEDICARE

## 2025-04-28 ENCOUNTER — APPOINTMENT (OUTPATIENT)
Dept: OCCUPATIONAL MEDICINE | Facility: HOSPITAL | Age: 75
End: 2025-04-28
Attending: ORTHOPAEDIC SURGERY
Payer: MEDICARE

## 2025-04-28 PROCEDURE — 97110 THERAPEUTIC EXERCISES: CPT

## 2025-04-28 PROCEDURE — 97140 MANUAL THERAPY 1/> REGIONS: CPT

## 2025-04-28 NOTE — PROGRESS NOTES
Occupational Therapy Daily Note           Subjective: Patient states that the ( flexion strap) really didn't work out that well    Pain: 3/10      Objective: See exercises flowsheet below for exercises and activities performed today.     ROM: (* denotes performed with pain)  Wrist Eval Wrist 1/22/2025 Wrist 3/13/2025 4/21/2025     Flexion: R 65, L 20  Extension: R 55, L 15  Ulnar Deviation: R 25, L 0  Radial Deviation R 5, L 0 Flexion: L 30  Extension: L 30  Ulnar Deviation: L 10  Radial Deviation L 10 Flexion: L  40  Extension: L 30 Flexion  30  Extension 45  UD 20  RD 10       LEFT HAND 3/13/2025:    Thumb IF MF RF SF   MP 0/40 0/80 0/80 0/80 0/75   PIP  0/30 0/55 0/50 0/70   DIP 0/50 0/40 0/30 0/45 0/50   JAMES 90 150 165 175 195       LEFT HAND 4/21/2025     Thumb IF MF RF SF   MP 45 0/80 0/85 0/80 0/60   PIP  0/70 0/65 0/55 0/75   DIP 60 0/30 0/30 0/40 0/60   JAMES  180 180 175 195       Assessment:     Pt. Continues to have significant stiffness in each digit on her left hand impacting her ability to grasp objects. She may benefit from an MP  block to encourage PIP and DIP flexion.    Goals: (24 visits)  Pt will be independent and compliant with comprehensive HEP to maintain progress achieved in OT-MET, updated  Pt will increase their (L) wrist flexion to at least 50 degrees for ease of dressing-PROGRESSING TOWARDS  Pt will increase their (L) wrist extension  to at least 50  degrees for ease bathing-PROGRESSING TOWARDS  Updated Goals 1/22/2025:   Pt will increase their (L) thumb JAMES to at least 100 degrees for ease of opening doors.-PROGRESSING TOWARDS  Pt will increase their (L) IF JAMES to at least 200 degrees for ease of manipulating buttons.-PROGRESSING TOWARDS  Pt will increase their (L) MF JAMES to at least 200 degrees for ease of holding steering wheel.-PROGRESSING TOWARDS  Pt will increase their (L) RF JAMES to at least 200  degrees for ease of holding cup.-PROGRESSING TOWARDS  Pt will increase their (L) SF  JAMES to at least 200 degrees for ease of cupping medications.-PROGRESSING TOWARDS  Pt will increase their (L)  strength to at least 20# for ease of carying groceries.-PROGRESSING TOWARDS  New goal  4/22/2025  Pt. Will identify 3-5 activity modification techniques for improved functional use of her hand    QuickDASH Outcome Score  Score: (Patient-Rptd) 84.09 % (12/17/2024  1:38 PM)        Plan: Continue skilled Occupational Therapy 1-2 x/week or a total of 32 visits over a 90 day period ( 5 remaining in this treatment plan with 8 additional for a total of 13  Treatment will include: ROM, possible BRENDAN brace for digital ROM , strengthening and activity modification        Patient/Family/Caregiver was advised of these findings, precautions, and treatment options and has agreed to actively participate in planning and for this course of care.    Thank you for your referral. If you have any questions, please contact me at Dept: [phone].    Sincerely,  Electronically signed by therapist: Alise Allan OT     Physician's certification required:  Yes  Please co-sign or sign and return this letter via fax as soon as possible to [phone].   I certify the need for these services furnished under this plan of treatment and while under my care.    X___________________________________________________ Date____________________    Certification From: 4/21/2025  To:7/20/2025      Date 3/17/2025    4/21/2025      4/28/2025             Visit # 17/24 19/24 4/28/2025                                Evaluation                 Manual                        STM forearm flexors and extensors   PIP joint mobs   15 min   STM forearm flexors and extensors   PIP joint mobs   15 min                                                   Ther ex     TE  30 min  TE 30           Fluidotherapy with AROM  7 mins 7 mins  PROM/AROM/AAROM wrist and forearm    Digital joint blocking DIP and PIP joints    Place and hold  Fabricated a flexion  strap for composite flexion to wear 4 x per day for 10 min  Towel crumple      PROM/AROM/AAROM wrist and forearm    Digital joint blocking DIP and PIP joints    Cone grasp with yellow putty    Custom made a an MP block orthoses to encourage PIP and DIP flexion.    Power web for extrinsic stretch and grasp    Dynadisc for gentle weight bearing     Towel crumple for fisting   Red flex bar for pro/sup           Joint blocking for PIP and DIP flexion 15x1                 Red Putty Exercises -  -alternating opposition tip pinch  -adduction  -PIP/DIP hook   -lumbrical extension  10x2 -  -alternating opposition tip pinch  -adduction  10x2  -lumbrical squeezes 10x1               Wrist flexion with composite digital flexion   10x2                                                                           HEP instruction                                        Therapeutic Activity                                       Neuromuscular Re-education                                                             Modalities                                                                 HEP:  Assignment date:   tendon glides, AROM: wrist flex/extend, RD/UD, forearm sup/pronation, shoulder flexion towel slides 12/19/24   Joint blocking at PIP 1/6/2025    Blue therafoam exercises 1/30/2025   Pink Putty Exercises 3/17/2025        Access Code: [email or web address]  URL: [email or web address]  Date: 03/17/2025  Prepared by: Carisa rasheed  Exercises  - Putty Squeezes  - 1 x daily - 3 x weekly - 2 sets - 10 reps  - Tip Pinch with Putty  - 1 x daily - 3 x weekly - 2 sets - 10 reps  - Seated Claw Fist with Putty  - 1 x daily - 3 x weekly - 2 sets - 10 reps  - Finger Adduction with Putty  - 1 x daily - 3 x weekly - 1 sets - 10 reps      Charges: TE 2, manual therapy x 1  Total Timed Treatment: 45 min  Total Treatment Time: 45 min

## 2025-04-30 ENCOUNTER — HOSPITAL ENCOUNTER (OUTPATIENT)
Dept: BONE DENSITY | Age: 75
Discharge: HOME | End: 2025-04-30
Attending: INTERNAL MEDICINE
Payer: MEDICARE

## 2025-04-30 ENCOUNTER — HOSPITAL ENCOUNTER (OUTPATIENT)
Dept: MAMMOGRAPHY | Age: 75
Discharge: HOME | End: 2025-04-30
Attending: INTERNAL MEDICINE
Payer: MEDICARE

## 2025-04-30 DIAGNOSIS — Z12.31 VISIT FOR SCREENING MAMMOGRAM: ICD-10-CM

## 2025-04-30 DIAGNOSIS — Z78.0 POSTMENOPAUSAL: ICD-10-CM

## 2025-04-30 PROCEDURE — 77080 DXA BONE DENSITY AXIAL: CPT | Performed by: INTERNAL MEDICINE

## 2025-04-30 PROCEDURE — 77063 BREAST TOMOSYNTHESIS BI: CPT | Performed by: INTERNAL MEDICINE

## 2025-04-30 PROCEDURE — 77067 SCR MAMMO BI INCL CAD: CPT | Performed by: INTERNAL MEDICINE

## 2025-05-05 ENCOUNTER — OFFICE VISIT (OUTPATIENT)
Dept: OCCUPATIONAL MEDICINE | Facility: HOSPITAL | Age: 75
End: 2025-05-05
Attending: ORTHOPAEDIC SURGERY
Payer: MEDICARE

## 2025-05-05 PROCEDURE — 97110 THERAPEUTIC EXERCISES: CPT

## 2025-05-05 NOTE — PROGRESS NOTES
Occupational Therapy Daily Note           Subjective:     Patient reports that she likes the orthoses to block her fingers. She reports that the fingers are getting a little better     Pain: 2/10      Objective: See exercises flowsheet below for exercises and activities performed today.     ROM: (* denotes performed with pain)  Wrist Eval Wrist 1/22/2025 Wrist 3/13/2025 4/21/2025     Flexion: R 65, L 20  Extension: R 55, L 15  Ulnar Deviation: R 25, L 0  Radial Deviation R 5, L 0 Flexion: L 30  Extension: L 30  Ulnar Deviation: L 10  Radial Deviation L 10 Flexion: L  40  Extension: L 30 Flexion  30  Extension 45  UD 20  RD 10       LEFT HAND 3/13/2025:    Thumb IF MF RF SF   MP 0/40 0/80 0/80 0/80 0/75   PIP  0/30 0/55 0/50 0/70   DIP 0/50 0/40 0/30 0/45 0/50   AJMES 90 150 165 175 195       LEFT HAND 4/21/2025     Thumb IF MF RF SF   MP 45 0/80 0/85 0/80 0/60   PIP  0/70 0/65 0/55 0/75   DIP 60 0/30 0/30 0/40 0/60   JAMES  180 180 175 195       Assessment:     Gradual improvement with digital ROM   Goals: (24 visits)  Pt will be independent and compliant with comprehensive HEP to maintain progress achieved in OT-MET, updated  Pt will increase their (L) wrist flexion to at least 50 degrees for ease of dressing-PROGRESSING TOWARDS  Pt will increase their (L) wrist extension  to at least 50  degrees for ease bathing-PROGRESSING TOWARDS  Updated Goals 1/22/2025:   Pt will increase their (L) thumb JAMES to at least 100 degrees for ease of opening doors.-PROGRESSING TOWARDS  Pt will increase their (L) IF JAMES to at least 200 degrees for ease of manipulating buttons.-PROGRESSING TOWARDS  Pt will increase their (L) MF JAMES to at least 200 degrees for ease of holding steering wheel.-PROGRESSING TOWARDS  Pt will increase their (L) RF JAMES to at least 200  degrees for ease of holding cup.-PROGRESSING TOWARDS  Pt will increase their (L) SF JAMES to at least 200 degrees for ease of cupping medications.-PROGRESSING TOWARDS  Pt will  increase their (L)  strength to at least 20# for ease of carying groceries.-PROGRESSING TOWARDS  New goal  4/22/2025  Pt. Will identify 3-5 activity modification techniques for improved functional use of her hand    QuickDASH Outcome Score  Score: (Patient-Rptd) 84.09 % (12/17/2024  1:38 PM)        Plan: Continue skilled Occupational Therapy 1-2 x/week or a total of 32 visits over a 90 day period ( 5 remaining in this treatment plan with 8 additional for a total of 13  Treatment will include: ROM, possible BRENDAN brace for digital ROM , strengthening and activity modification        Patient/Family/Caregiver was advised of these findings, precautions, and treatment options and has agreed to actively participate in planning and for this course of care.    Thank you for your referral. If you have any questions, please contact me at Dept: [phone].    Sincerely,  Electronically signed by therapist: Alise Allan OT     Physician's certification required:  Yes  Please co-sign or sign and return this letter via fax as soon as possible to [phone].   I certify the need for these services furnished under this plan of treatment and while under my care.    X___________________________________________________ Date____________________    Certification From: 4/21/2025  To:7/20/2025      Date 3/17/2025    4/21/2025      4/28/2025    5/5/2025           Visit # 17/24 19/24 20/24                            Evaluation                 Manual                        STM forearm flexors and extensors   PIP joint mobs   15 min   STM forearm flexors and extensors   PIP joint mobs   15 min                                                   Ther ex     TE  30 min  TE 30           Fluidotherapy with AROM  7 mins 7 mins  PROM/AROM/AAROM wrist and forearm    Digital joint blocking DIP and PIP joints    Place and hold  Fabricated a flexion strap for composite flexion to wear 4 x per day for 10 min  Towel crumple       PROM/AROM/AAROM wrist and forearm    Digital joint blocking DIP and PIP joints    Cone grasp with yellow putty    Custom made a an MP block orthoses to encourage PIP and DIP flexion.    Power web for extrinsic stretch and grasp    Dynadisc for gentle weight bearing     Towel crumple for fisting   Red flex bar for pro/sup  PROM/AROM/AAROM wrist and forearm in Fluidotherapy for 8 minutes     Digital joint blocking DIP and PIP joints    Cone grasp with yellow putty    Gripper and pom pom    Lumbrical grasp with pom poms  Power web for extrinsic stretch and grasp    Dynadisc for gentle weight bearing     Towel crumple for fisting   Red flex bar for pro/sup  Intrinsic stretches          Joint blocking for PIP and DIP flexion 15x1                 Red Putty Exercises -  -alternating opposition tip pinch  -adduction  -PIP/DIP hook   -lumbrical extension  10x2 -  -alternating opposition tip pinch  -adduction  10x2  -lumbrical squeezes 10x1               Wrist flexion with composite digital flexion   10x2                                                                           HEP instruction                                        Therapeutic Activity                                       Neuromuscular Re-education                                                             Modalities                                                                 HEP:  Assignment date:   tendon glides, AROM: wrist flex/extend, RD/UD, forearm sup/pronation, shoulder flexion towel slides 12/19/24   Joint blocking at PIP 1/6/2025    Blue therafoam exercises 1/30/2025   Pink Putty Exercises 3/17/2025        Access Code: [email or web address]  URL: [email or web address]  Date: 03/17/2025  Prepared by: Carisa rasheed  Exercises  - Putty Squeezes  - 1 x daily - 3 x weekly - 2 sets - 10 reps  - Tip Pinch with Putty  - 1 x daily - 3 x weekly - 2 sets - 10 reps  - Seated Claw Fist with Putty  - 1 x daily - 3 x weekly - 2  sets - 10 reps  - Finger Adduction with Putty  - 1 x daily - 3 x weekly - 1 sets - 10 reps      Charges: TE 2, manual therapy x 1  Total Timed Treatment: 45 min  Total Treatment Time: 45 min

## 2025-05-12 ENCOUNTER — OFFICE VISIT (OUTPATIENT)
Dept: OCCUPATIONAL MEDICINE | Facility: HOSPITAL | Age: 75
End: 2025-05-12
Attending: ORTHOPAEDIC SURGERY
Payer: MEDICARE

## 2025-05-12 PROCEDURE — 97530 THERAPEUTIC ACTIVITIES: CPT

## 2025-05-12 PROCEDURE — 97110 THERAPEUTIC EXERCISES: CPT

## 2025-05-12 NOTE — PROGRESS NOTES
Occupational Therapy Discharge Note     Discharge Summary  Pt has attended 21 visits in Occupational Therapy.        Subjective:   Pt. Reports that it is easier  to use her fork and cut food       Pain: 3/10      Objective: See exercises flowsheet below for exercises and activities performed today.     ROM: (* denotes performed with pain)  Wrist Eval Wrist 1/22/2025 Wrist 3/13/2025 4/21/2025   5/12/2025     Flexion: R 65, L 20  Extension: R 55, L 15  Ulnar Deviation: R 25, L 0  Radial Deviation R 5, L 0 Flexion: L 30  Extension: L 30  Ulnar Deviation: L 10  Radial Deviation L 10 Flexion: L  40  Extension: L 30 Flexion  30  Extension 45  UD 20  RD 10 40  50  25  20       LEFT HAND 4/21/2025     Thumb IF MF RF SF   MP 45 0/80 0/85 0/80 0/60   PIP  0/70 0/65 0/55 0/75   DIP 60 0/30 0/30 0/40 0/60   JAMES  180 180 175 195      Thumb IF MF RF SF   MP 45 0/80 0/85 0/85 0/80   PIP  0/70 0/65 0/60 0/80   DIP 60 0/30 0/30 0/40 0/50   JAMES  180 180 185 210      strength:  Right 35.8  Left: 8 lbs     Assessment:     Patient appears to have reached a plateau in therapy. Her digital ROM is slightly improved with increased functional use of her left hand. Her wrist flexion is functional and her extension allows her to weight bear and push up from a chair. She demonstrates a good understanding of her home exercise program and joint protection techniques. Her left  strength is significantly impaired due to lack of digital ROM. She does demonstrate I with putty exercises. She will benefit from following up with her HEP and follow up with her DR.     Goals: (24 visits)  Pt will be independent and compliant with comprehensive HEP to maintain progress achieved in OT-MET, updated  Pt will increase their (L) wrist flexion to at least 50 degrees for ease of dressing-PROGRESSING TOWARDS  Pt will increase their (L) wrist extension  to at least 50  degrees for ease bathing- MET  Updated Goals 1/22/2025:   Pt will increase their (L)  thumb JAMES to at least 100 degrees for ease of opening doors.-PROGRESSING TOWARDS  Pt will increase their (L) IF JAMES to at least 200 degrees for ease of manipulating buttons. Not met  Pt will increase their (L) MF JAMES to at least 200 degrees for ease of holding steering wheel. Not met  Pt will increase their (L) RF JAMES to at least 200  degrees for ease of holding cup.not met  Pt will increase their (L) SF JAMES to at least 200 degrees for ease of cupping medications not met  Pt will increase their (L)  strength to at least 20# for ease of carying groceries.not met   New goal  4/22/2025  Pt. Will identify 3-5 activity modification techniques for improved functional use of her hand  met    QuickDASH Outcome Score  Score: (Patient-Rptd) 84.09 % (12/17/2024  1:38 PM)        QuickDASH Outcome Score  Score: (Patient-Rptd) 84.09 % (12/17/2024  1:38 PM)    Post QuickDASH Outcome Score  Post Score: 11.36 % (5/12/2025  2:14 PM)    72.73 % improvement    Plan: Discharge on a Home program    Thank you for your referral. If you have any questions, please contact me at Dept: [phone].    Sincerely,  Electronically signed by therapist: Alise Allan OT     Physician's certification required:  No  Please co-sign or sign and return this letter via fax as soon as possible to [phone].   I certify the need for these services furnished under this plan of treatment and while under my care.    X___________________________________________________ Date____________________        Date 3/17/2025    4/21/2025      4/28/2025    5/5/2025    5/12/2025          Visit # 17/24 19/24 20/24 21/24                            Evaluation                  Manual                         STM forearm flexors and extensors   PIP joint mobs   15 min   STM forearm flexors and extensors   PIP joint mobs   15 min                                                      Ther ex     TE  30 min  TE 30            Fluidotherapy with AROM  7 mins 7  mins  PROM/AROM/AAROM wrist and forearm    Digital joint blocking DIP and PIP joints    Place and hold  Fabricated a flexion strap for composite flexion to wear 4 x per day for 10 min  Towel crumple      PROM/AROM/AAROM wrist and forearm    Digital joint blocking DIP and PIP joints    Cone grasp with yellow putty    Custom made a an MP block orthoses to encourage PIP and DIP flexion.    Power web for extrinsic stretch and grasp    Dynadisc for gentle weight bearing     Towel crumple for fisting   Red flex bar for pro/sup  PROM/AROM/AAROM wrist and forearm in Fluidotherapy for 8 minutes     Digital joint blocking DIP and PIP joints    Cone grasp with yellow putty    Gripper and pom pom    Lumbrical grasp with pom poms  Power web for extrinsic stretch and grasp    Dynadisc for gentle weight bearing     Towel crumple for fisting   Red flex bar for pro/sup  Intrinsic stretches   PROM/AROM/AAROM wrist and forearm in Fluidotherapy for 8 minutes     Digital joint blocking DIP and PIP joints    Measurements/discharge note     Flexor tendon glides    Thumb opposition         Joint blocking for PIP and DIP flexion 15x1                  Red Putty Exercises -  -alternating opposition tip pinch  -adduction  -PIP/DIP hook   -lumbrical extension  10x2 -  -alternating opposition tip pinch  -adduction  10x2  -lumbrical squeezes 10x1        time spent reviewing joint protection techniques and home program         Wrist flexion with composite digital flexion   10x2                                                                               HEP instruction                                          Therapeutic Activity                                         Neuromuscular Re-education                                                                Modalities                                                                    HEP:  Assignment date:   tendon glides, AROM: wrist flex/extend, RD/UD, forearm sup/pronation,  shoulder flexion towel slides 12/19/24   Joint blocking at PIP 1/6/2025    Blue therafoam exercises 1/30/2025   Pink Putty Exercises 3/17/2025        Access Code: [email or web address]  URL: [email or web address]  Date: 03/17/2025  Prepared by: Carisa rasheed  Exercises  - Putty Squeezes  - 1 x daily - 3 x weekly - 2 sets - 10 reps  - Tip Pinch with Putty  - 1 x daily - 3 x weekly - 2 sets - 10 reps  - Seated Claw Fist with Putty  - 1 x daily - 3 x weekly - 2 sets - 10 reps  - Finger Adduction with Putty  - 1 x daily - 3 x weekly - 1 sets - 10 reps      Charges: TE 2, TA x 1  Total Timed Treatment: 45 min  Total Treatment Time: 45 min

## (undated) DEVICE — KIT ENDO ORCAPOD 160/180/190

## (undated) DEVICE — SNARE OPTMZ PLPCTM TRP

## (undated) DEVICE — LINE MNTR ADLT SET O2 INTMD

## (undated) DEVICE — FORCEP RADIAL JAW 4

## (undated) DEVICE — KIT CLEAN ENDOKIT 1.1OZ GOWNX2

## (undated) DEVICE — 35 ML SYRINGE REGULAR TIP: Brand: MONOJECT

## (undated) DEVICE — 60 ML SYRINGE REGULAR TIP: Brand: MONOJECT

## (undated) DEVICE — MEDI-VAC NON-CONDUCTIVE SUCTION TUBING 6MM X 1.8M (6FT.) L: Brand: CARDINAL HEALTH

## (undated) DEVICE — Device: Brand: CUSTOM PROCEDURE KIT

## (undated) DEVICE — SNARE ENDOSCOPIC 10MM ROUND

## (undated) DEVICE — Device: Brand: DEFENDO AIR/WATER/SUCTION AND BIOPSY VALVE

## (undated) NOTE — LETTER
11/9/2021              [place] [address]",     Here is the mammogram order you requested.        Sincerely,    Mirian Terry MD  Doctor [address] [place] [address]  [phone]

## (undated) NOTE — LETTER
[facility], [place], [place]  [address]  [address] [address]  [phone]                10/15/20      [address] [place] [address]        Dear Vaughn Cage,    I reviewed the pathology report from the

## (undated) NOTE — LETTER
[facility]  Administration of Anesthesia  1. Marlena Plata, or _________________________________ acting on her behalf, (Patient) (Dependent/Representative) request to receive anesthesia for my pending procedure/operation/treatment.   A 6. OBSTETRIC PATIENTS: Specific risks/consequences of spinal/epidural anesthesia may include itching, low blood pressure, difficulty urinating, slowing of the baby's heart rate and headache.  Rare risks include infections, high spinal block, spinal bleeding ___________________________________________________           _____________________________________________________  Date/Time                                                                                               Responsible person in case of minor

## (undated) NOTE — LETTER
04/29/21        [address]  [facility]      Dear Jayant Gregg records indicate that you have outstanding lab work and or testing that was ordered for you and has not yet been completed:  Orders Placed This Encounter      ORT

## (undated) NOTE — LETTER
September 22, 2021     [facility] [address]      Dear Vaughn Cage:    Below are the results from your recent visit:  Mixed hyerlipidemia   Triglycerides and bad cholesterol LDL are elevated   Avoid sweets low carb diet  and low 1.9 - 3.7 g/dL (calc)    ALBUMIN/GLOBULIN RATIO 1.6 1.0 - 2.5 (calc)    BILIRUBIN, TOTAL 0.6 0.2 - 1.2 mg/dL    ALKALINE PHOSPHATASE 99 37 - 153 U/L    AST 24 10 - 35 U/L    ALT 27 6 - 29 U/L    Narrative    FASTING:YES    FASTING: YES   URINALYSIS WITH CU

## (undated) NOTE — LETTER
AUTHORIZATION FOR SURGICAL OPERATION OR OTHER PROCEDURE    1. I hereby authorize Dr. Ramos, and [facility] staff assigned to my case to perform the following operation and/or procedure at the [facility] site:    Left shoulder cortisone injection  _______________________________________________________________________________________________      _______________________________________________________________________________________________    2.  My physician has explained the nature and purpose of the operation or other procedure, possible alternative methods of treatment, the risks involved, and the possibility of complication to me.  I acknowledge that no guarantee has been made as to the result that may be obtained.  3.  I recognize that, during the course of this operation, or other procedure, unforseen conditions may necessitate additional or different procedure than those listed above.  I, therefore, further authorize and request that the above named physician, his/her physician assistants or designees perform such procedures as are, in his/her professional opinion, necessary and desirable.  4.  Any tissue or organs removed in the operation or other procedure may be disposed of by and at the discretion of the [facility] and [facility].  5.  I understand that in the event of a medical emergency, I will be transported by local paramedics to [facility] or other hospital emergency department.  6.  I certify that I have read and fully understand the above consent to operation and/or other procedure.    7.  I acknowledge that my physician has explained sedation/analgesia administration to me including the risks and benefits.  I consent to the administration of sedation/analgesia as may be necessary or desirable in the judgement of my physician.    Witness signature: ___________________________________________________ Date:   ______/______/_____                    Time:  ________ A.M.  P.M.       Patient Name:  ______________________________________________________  (please print)      Patient signature:  ___________________________________________________             Relationship to Patient:           []  Parent    Responsible person                          []  Spouse  In case of minor or                    [] Other  _____________   Incompetent name:  __________________________________________________                               (please print)      _____________      Responsible person  In case of minor or  Incompetent signature:  _______________________________________________    Statement of Physician  My signature below affirms that prior to the time of the procedure, I have explained to the patient and/or his/her guardian, the risks and benefits involved in the proposed treatment and any reasonable alternative to the proposed treatment.  I have also explained the risks and benefits involved in the refusal of the proposed treatment and have answered the patient's questions.                        Date:  ______/______/_______  Provider                      Signature:  __________________________________________________________       Time:  ___________ A.M    P.M.

## (undated) NOTE — LETTER
[address] [facility] [address] [place], [place]  Authorization for Surgical Operation and Procedure                                                                                           I hereby authorize Dev Hull MD, my physician and his/her assistants (if applicable), which may include medical students, residents, and/or fellows, to perform the following surgical operation/ procedure and administer such anesthesia as may be determined necessary by my physician: Operation/Procedure name (s) COLONOSCOPY on Alda Maddox   2. I recognize that during the surgical operation/procedure, unforeseen conditions may necessitate additional or different procedures than those listed above. I, therefore, further authorize and request that the above-named surgeon, assistants, or designees perform such procedures as are, in their judgment, necessary and desirable. 3.   My surgeon/physician has discussed prior to my surgery the potential benefits, risks and side effects of this procedure; the likelihood of achieving goals; and potential problems that might occur during recuperation. They also discussed reasonable alternatives to the procedure, including risks, benefits, and side effects related to the alternatives and risks related to not receiving this procedure. I have had all my questions answered and I acknowledge that no guarantee has been made as to the result that may be obtained. 4.   Should the need arise during my operation/procedure, which includes change of level of care prior to discharge, I also consent to the administration of blood and/or blood products. Further, I understand that despite careful testing and screening of blood or blood products by collecting agencies, I may still be subject to ill effects as a result of receiving a blood transfusion and/or blood products.   The following are some, but not all, of the potential risks that can occur: fever and allergic reactions, hemolytic reactions, transmission of diseases such as Hepatitis, AIDS and Cytomegalovirus (CMV) and fluid overload. In the event that I wish to have an autologous transfusion of my own blood, or a directed donor transfusion, I will discuss this with my physician. Check only if Refusing Blood or Blood Products  I understand refusal of blood or blood products as deemed necessary by my physician may have serious consequences to my condition to include possible death. I hereby assume responsibility for my refusal and release the hospital, its personnel, and my physicians from any responsibility for the consequences of my refusal.    o  Refuse   5. I authorize the use of any specimen, organs, tissues, body parts or foreign objects that may be removed from my body during the operation/procedure for diagnosis, research or teaching purposes and their subsequent disposal by hospital authorities. I also authorize the release of specimen test results and/or written reports to my treating physician on the hospital medical staff or other referring or consulting physicians involved in my care, at the discretion of the Pathologist or my treating physician. 6.   I consent to the photographing or videotaping of the operations or procedures to be performed, including appropriate portions of my body for medical, scientific, or educational purposes, provided my identity is not revealed by the pictures or by descriptive texts accompanying them. If the procedure has been photographed/videotaped, the surgeon will obtain the original picture, image, videotape or CD. The hospital will not be responsible for storage, release or maintenance of the picture, image, tape or CD.    7.   I consent to the presence of a  or observers in the operating room as deemed necessary by my physician or their designees.     8.   I recognize that in the event my procedure results in extended X-Ray/fluoroscopy time, I may develop a skin reaction. 9. If I have a Do Not Attempt Resuscitation (DNAR) order in place, that status will be suspended while in the operating room, procedural suite, and during the recovery period unless otherwise explicitly stated by me (or a person authorized to consent on my behalf). The surgeon or my attending physician will determine when the applicable recovery period ends for purposes of reinstating the DNAR order. 10. Patients having a sterilization procedure: I understand that if the procedure is successful the results will be permanent and it will therefore be impossible for me to inseminate, conceive, or bear children. I also understand that the procedure is intended to result in sterility, although the result has not been guaranteed. 11. I acknowledge that my physician has explained sedation/analgesia administration to me including the risk and benefits I consent to the administration of sedation/analgesia as may be necessary or desirable in the judgment of my physician. I CERTIFY THAT I HAVE READ AND FULLY UNDERSTAND THE ABOVE CONSENT TO OPERATION and/or OTHER PROCEDURE.     _________________________________________ _________________________________     ___________________________________  Signature of Patient     Signature of Responsible Person                   Printed Name of Responsible Person                              _________________________________________ ______________________________        ___________________________________  Signature of Witness         Date  Time         Relationship to Patient    STATEMENT OF PHYSICIAN My signature below affirms that prior to the time of the procedure; I have explained to the patient and/or his/her legal representative, the risks and benefits involved in the proposed treatment and any reasonable alternative to the proposed treatment.  I have also explained the risks and benefits involved in refusal of the proposed treatment and alternatives to the proposed treatment and have answered the patient's questions.  If I have a significant financial interest in a co-management agreement or a significant financial interest in any product or implant, or other significant relationship used in this procedure/surgery, I have disclosed this and had a discussion with my patient.     _______________________________________________________________ _____________________________  Amberly Coke of Physician)                                                                                         (Date)                                   (Time)  Patient Name: Jonathan Hill    : 1950   Printed: 2023      Medical Record #: [id number]                                              Page 1 of 1

## (undated) NOTE — LETTER
[facility]  [address] [facility] [address]  [phone]     08/25/2023      Hello,     This is the [facility], office of Dr. Anne Santiago    Thank you for putting your trust in [place] [address]. Our goal is to deliver the highest quality healthcare and an exceptional patient experience. Upon reviewing of your medical record shows you are due for the following:         Annual Medicare Physical           Please call [phone] to schedule your appointment or schedule online via [employer]. If you changed to a new provider at another facility, please notify the clinic to update your records. If you had any recent testing at another facility, please have your results faxed to our office at [phone]. Thank you and have a great day!

## (undated) NOTE — LETTER
[address], [place]  Authorization for Invasive Procedures  1.  I hereby authorize Dr. Leobardo Camacho , my physician and whomever may be designated as the doctor's assistant, to perform the following operation and/or procedure:  [place] 4. Should the need arise during my operation or immediate post-operative period; I also consent to the administration of blood and/or blood products.  Further, I understand that despite careful testing and screening of blood and blood products, I may still 9. Patients having a sterilization procedure: I understand that if the procedure is successful the results will be permanent and it will therefore be impossible for me to inseminate, conceive or bear children.  I also understand that the procedure is intend